# Patient Record
Sex: FEMALE | Race: BLACK OR AFRICAN AMERICAN | NOT HISPANIC OR LATINO | URBAN - METROPOLITAN AREA
[De-identification: names, ages, dates, MRNs, and addresses within clinical notes are randomized per-mention and may not be internally consistent; named-entity substitution may affect disease eponyms.]

---

## 2018-02-08 ENCOUNTER — EMERGENCY (EMERGENCY)
Facility: HOSPITAL | Age: 43
LOS: 1 days | Discharge: HOME | End: 2018-02-08
Attending: EMERGENCY MEDICINE | Admitting: EMERGENCY MEDICINE
Payer: COMMERCIAL

## 2018-02-08 VITALS
HEART RATE: 81 BPM | DIASTOLIC BLOOD PRESSURE: 88 MMHG | RESPIRATION RATE: 20 BRPM | SYSTOLIC BLOOD PRESSURE: 128 MMHG | OXYGEN SATURATION: 98 % | TEMPERATURE: 99 F

## 2018-02-08 VITALS
HEART RATE: 91 BPM | WEIGHT: 199.96 LBS | RESPIRATION RATE: 20 BRPM | HEIGHT: 63 IN | DIASTOLIC BLOOD PRESSURE: 95 MMHG | TEMPERATURE: 98 F | SYSTOLIC BLOOD PRESSURE: 162 MMHG | OXYGEN SATURATION: 100 %

## 2018-02-08 DIAGNOSIS — Z98.890 OTHER SPECIFIED POSTPROCEDURAL STATES: Chronic | ICD-10-CM

## 2018-02-08 DIAGNOSIS — R07.89 OTHER CHEST PAIN: ICD-10-CM

## 2018-02-08 LAB
ALBUMIN SERPL ELPH-MCNC: 4.4 G/DL — SIGNIFICANT CHANGE UP (ref 3.3–5)
ALP SERPL-CCNC: 49 U/L — SIGNIFICANT CHANGE UP (ref 40–120)
ALT FLD-CCNC: 10 U/L — SIGNIFICANT CHANGE UP (ref 10–45)
ANION GAP SERPL CALC-SCNC: 11 MMOL/L — SIGNIFICANT CHANGE UP (ref 5–17)
AST SERPL-CCNC: 17 U/L — SIGNIFICANT CHANGE UP (ref 10–40)
BASOPHILS NFR BLD AUTO: 1 % — SIGNIFICANT CHANGE UP (ref 0–2)
BILIRUB SERPL-MCNC: 0.3 MG/DL — SIGNIFICANT CHANGE UP (ref 0.2–1.2)
BUN SERPL-MCNC: 6 MG/DL — LOW (ref 7–23)
CALCIUM SERPL-MCNC: 9.6 MG/DL — SIGNIFICANT CHANGE UP (ref 8.4–10.5)
CHLORIDE SERPL-SCNC: 104 MMOL/L — SIGNIFICANT CHANGE UP (ref 96–108)
CO2 SERPL-SCNC: 27 MMOL/L — SIGNIFICANT CHANGE UP (ref 22–31)
CREAT SERPL-MCNC: 0.71 MG/DL — SIGNIFICANT CHANGE UP (ref 0.5–1.3)
EOSINOPHIL NFR BLD AUTO: 1 % — SIGNIFICANT CHANGE UP (ref 0–6)
GLUCOSE SERPL-MCNC: 103 MG/DL — HIGH (ref 70–99)
HCT VFR BLD CALC: 36.5 % — SIGNIFICANT CHANGE UP (ref 34.5–45)
HGB BLD-MCNC: 11.7 G/DL — SIGNIFICANT CHANGE UP (ref 11.5–15.5)
LYMPHOCYTES # BLD AUTO: 42.4 % — SIGNIFICANT CHANGE UP (ref 13–44)
MCHC RBC-ENTMCNC: 24.3 PG — LOW (ref 27–34)
MCHC RBC-ENTMCNC: 32.1 G/DL — SIGNIFICANT CHANGE UP (ref 32–36)
MCV RBC AUTO: 75.7 FL — LOW (ref 80–100)
MONOCYTES NFR BLD AUTO: 9.4 % — SIGNIFICANT CHANGE UP (ref 2–14)
NEUTROPHILS NFR BLD AUTO: 46.2 % — SIGNIFICANT CHANGE UP (ref 43–77)
PLATELET # BLD AUTO: 250 K/UL — SIGNIFICANT CHANGE UP (ref 150–400)
POTASSIUM SERPL-MCNC: 4.3 MMOL/L — SIGNIFICANT CHANGE UP (ref 3.5–5.3)
POTASSIUM SERPL-SCNC: 4.3 MMOL/L — SIGNIFICANT CHANGE UP (ref 3.5–5.3)
PROT SERPL-MCNC: 7.8 G/DL — SIGNIFICANT CHANGE UP (ref 6–8.3)
RBC # BLD: 4.82 M/UL — SIGNIFICANT CHANGE UP (ref 3.8–5.2)
RBC # FLD: 14.1 % — SIGNIFICANT CHANGE UP (ref 10.3–16.9)
SODIUM SERPL-SCNC: 142 MMOL/L — SIGNIFICANT CHANGE UP (ref 135–145)
TROPONIN T SERPL-MCNC: <0.01 NG/ML — SIGNIFICANT CHANGE UP (ref 0–0.01)
WBC # BLD: 4.8 K/UL — SIGNIFICANT CHANGE UP (ref 3.8–10.5)
WBC # FLD AUTO: 4.8 K/UL — SIGNIFICANT CHANGE UP (ref 3.8–10.5)

## 2018-02-08 PROCEDURE — 71046 X-RAY EXAM CHEST 2 VIEWS: CPT | Mod: 26

## 2018-02-08 PROCEDURE — 99284 EMERGENCY DEPT VISIT MOD MDM: CPT | Mod: 25

## 2018-02-08 PROCEDURE — 93010 ELECTROCARDIOGRAM REPORT: CPT

## 2018-02-08 PROCEDURE — 93005 ELECTROCARDIOGRAM TRACING: CPT

## 2018-02-08 PROCEDURE — 71046 X-RAY EXAM CHEST 2 VIEWS: CPT

## 2018-02-08 PROCEDURE — 96374 THER/PROPH/DIAG INJ IV PUSH: CPT

## 2018-02-08 PROCEDURE — 99285 EMERGENCY DEPT VISIT HI MDM: CPT | Mod: 25

## 2018-02-08 RX ORDER — MONTELUKAST 4 MG/1
0 TABLET, CHEWABLE ORAL
Qty: 0 | Refills: 0 | COMMUNITY

## 2018-02-08 RX ORDER — FAMOTIDINE 10 MG/ML
20 INJECTION INTRAVENOUS ONCE
Qty: 0 | Refills: 0 | Status: COMPLETED | OUTPATIENT
Start: 2018-02-08 | End: 2018-02-08

## 2018-02-08 RX ADMIN — FAMOTIDINE 20 MILLIGRAM(S): 10 INJECTION INTRAVENOUS at 11:52

## 2018-02-08 RX ADMIN — Medication 30 MILLILITER(S): at 11:52

## 2018-02-08 NOTE — ED PROVIDER NOTE - OBJECTIVE STATEMENT
42yoF o/w healthy, here with 2 weeks of left sided chest pain, intermittent, no associated sob/alfaro, n/v, dizziness or diaphoresis, lasts ~30 minutes and then self-resolves. No f/c, no cough/uri sx. Pt does report increased stress at work over the last several weeks and previous hx of anxiety attacks, but cannot recall what kind of sx she had. No family hx of heart disease. No recent travel or trauma. No other complaints.

## 2018-02-08 NOTE — ED PROVIDER NOTE - MEDICAL DECISION MAKING DETAILS
42yoF here with 2 weeks of intermittent L lower chest wall pain, no associated sx, +increased stress at work, vitals stable, exam unremarkable, trop negative, PERC negative. Sx likely stress related, low concern for cardiac etiology, will refer to cardiology for outpt eval. return precautions reviewed, verbalized understanding, agrees w/plan for discharge home and will refer to cardiology.

## 2020-07-29 NOTE — ED PROVIDER NOTE - CROS ED NEURO NEG
Last office visit: 6/10/2019    Next scheduled/on recall list: scheduled to see Sindhu Faulkner NP on 8/27/2020    Medication/dose: amolodipine 5 mg tablet   Take 1 tablet by mouth daily    Quantity/#refills: #90 no refills    Refill request completed? Yes    
no headache

## 2021-04-06 DIAGNOSIS — Z23 ENCOUNTER FOR IMMUNIZATION: ICD-10-CM

## 2021-04-18 ENCOUNTER — IMMUNIZATIONS (OUTPATIENT)
Dept: FAMILY MEDICINE CLINIC | Facility: HOSPITAL | Age: 46
End: 2021-04-18

## 2021-04-18 DIAGNOSIS — Z23 ENCOUNTER FOR IMMUNIZATION: Primary | ICD-10-CM

## 2021-04-18 PROCEDURE — 91300 SARS-COV-2 / COVID-19 MRNA VACCINE (PFIZER-BIONTECH) 30 MCG: CPT

## 2021-04-18 PROCEDURE — 0001A SARS-COV-2 / COVID-19 MRNA VACCINE (PFIZER-BIONTECH) 30 MCG: CPT

## 2021-05-12 ENCOUNTER — IMMUNIZATIONS (OUTPATIENT)
Dept: FAMILY MEDICINE CLINIC | Facility: HOSPITAL | Age: 46
End: 2021-05-12

## 2021-05-12 DIAGNOSIS — Z23 ENCOUNTER FOR IMMUNIZATION: Primary | ICD-10-CM

## 2021-05-12 PROCEDURE — 0002A SARS-COV-2 / COVID-19 MRNA VACCINE (PFIZER-BIONTECH) 30 MCG: CPT

## 2021-05-12 PROCEDURE — 91300 SARS-COV-2 / COVID-19 MRNA VACCINE (PFIZER-BIONTECH) 30 MCG: CPT

## 2022-06-28 ENCOUNTER — OFFICE VISIT (OUTPATIENT)
Dept: URGENT CARE | Facility: CLINIC | Age: 47
End: 2022-06-28
Payer: COMMERCIAL

## 2022-06-28 VITALS
HEART RATE: 82 BPM | HEIGHT: 63 IN | WEIGHT: 203 LBS | TEMPERATURE: 98 F | SYSTOLIC BLOOD PRESSURE: 153 MMHG | OXYGEN SATURATION: 98 % | DIASTOLIC BLOOD PRESSURE: 97 MMHG | BODY MASS INDEX: 35.97 KG/M2 | RESPIRATION RATE: 16 BRPM

## 2022-06-28 DIAGNOSIS — J01.00 ACUTE NON-RECURRENT MAXILLARY SINUSITIS: Primary | ICD-10-CM

## 2022-06-28 PROCEDURE — 99213 OFFICE O/P EST LOW 20 MIN: CPT | Performed by: PHYSICIAN ASSISTANT

## 2022-06-28 RX ORDER — MONTELUKAST SODIUM 10 MG/1
10 TABLET ORAL
COMMUNITY

## 2022-06-28 RX ORDER — FLUTICASONE PROPIONATE 50 MCG
50 SPRAY, SUSPENSION (ML) NASAL
COMMUNITY

## 2022-06-28 NOTE — PROGRESS NOTES
[facility]        NAME: Maximo Burger is a 48 y o  female  : 1975    MRN: [id number]  DATE: 2022  TIME: 9:56 AM    Assessment and Plan   Acute non-recurrent maxillary sinusitis [J01 00]  1  Acute non-recurrent maxillary sinusitis           Patient Instructions     Patient Instructions   Discussed patient symptoms are most likely viral or allergy related  To continue daily antihistamine and Flonase  Can continue to take over-the-counter ibuprofen  With any progression or worsening of symptoms to return or be seen in ER  Follow up with PCP in 3-5 days  Proceed to  ER if symptoms worsen  Chief Complaint     Chief Complaint   Patient presents with    Earache     Pt reports earache and sinus pressure, 3x days         History of Present Illness       Patient is a 47-year-old female presenting today with cold symptoms x3 days  Patient notes over the last few days she has been experiencing some nasal congestion, sinus pressure and ear discomfort, has been taking her daily allergy medications and occasional ibuprofen which does provide some relief of her discomfort, notes she is feeling better today than initial onset  Denies fever, chills, trouble swallowing, SOB, ear discharge or drainage, hearing loss  Review of Systems   Review of Systems   Constitutional: Negative for chills, fatigue and fever  HENT: Positive for congestion, ear pain, postnasal drip and sinus pressure  Negative for sore throat  Eyes: Negative for redness and itching  Respiratory: Positive for cough  Negative for chest tightness and shortness of breath  Cardiovascular: Negative for chest pain  Gastrointestinal: Negative for diarrhea, nausea and vomiting  Musculoskeletal: Negative for arthralgias and myalgias  Neurological: Negative for light-headedness and headaches           Current Medications       Current Outpatient Medications:     fluticasone (FLONASE) 50 mcg/act nasal spray, 50 sprays, Disp: , Rfl:     montelukast (Singulair) 10 mg tablet, 10 mg, Disp: , Rfl:     Current Allergies     Allergies as of 06/28/2022    (No Known Allergies)            The following portions of the patient's history were reviewed and updated as appropriate: allergies, current medications, past family history, past medical history, past social history, past surgical history and problem list      History reviewed  No pertinent past medical history  History reviewed  No pertinent surgical history  History reviewed  No pertinent family history  Medications have been verified  Objective   /97   Pulse 82   Temp 98 °F (36 7 °C)   Resp 16   Ht 5' 3" (1 6 m)   Wt 92 1 kg (203 lb)   LMP 06/20/2022   SpO2 98%   BMI 35 96 kg/m²        Physical Exam     Physical Exam  Vitals reviewed  Constitutional:       Appearance: Normal appearance  HENT:      Head: Normocephalic and atraumatic  Right Ear: Tympanic membrane, ear canal and external ear normal       Left Ear: Tympanic membrane, ear canal and external ear normal       Nose:      Right Turbinates: Swollen and pale  Left Turbinates: Swollen and pale  Right Sinus: Maxillary sinus tenderness present  No frontal sinus tenderness  Left Sinus: Maxillary sinus tenderness present  No frontal sinus tenderness  Mouth/Throat:      Mouth: Mucous membranes are moist       Pharynx: Oropharynx is clear  Eyes:      Conjunctiva/sclera: Conjunctivae normal       Pupils: Pupils are equal, round, and reactive to light  Cardiovascular:      Rate and Rhythm: Normal rate and regular rhythm  Pulses: Normal pulses  Heart sounds: Normal heart sounds  Pulmonary:      Effort: Pulmonary effort is normal       Breath sounds: Normal breath sounds  Musculoskeletal:      Cervical back: Normal range of motion  No tenderness  Lymphadenopathy:      Cervical: No cervical adenopathy  Skin:     General: Skin is warm  Capillary Refill: Capillary refill takes less than 2 seconds  Neurological:      Mental Status: She is alert

## 2022-06-28 NOTE — PATIENT INSTRUCTIONS
Discussed patient symptoms are most likely viral or allergy related  To continue daily antihistamine and Flonase  Can continue to take over-the-counter ibuprofen  With any progression or worsening of symptoms to return or be seen in ER

## 2022-06-28 NOTE — LETTER
June 28, 2022     Patient: Michael Gautam   YOB: 1975   Date of Visit: 6/28/2022       To Whom It May Concern: It is my medical opinion that Michael Gautam may return to work on 06/29/2022  Please excuse her absence until this time  If you have any questions or concerns, please don't hesitate to call           Sincerely,        Tiffanie Valentine PA-C    CC: No Recipients

## 2024-02-23 ENCOUNTER — OFFICE VISIT (OUTPATIENT)
Dept: FAMILY MEDICINE CLINIC | Facility: CLINIC | Age: 49
End: 2024-02-23
Payer: COMMERCIAL

## 2024-02-23 VITALS
SYSTOLIC BLOOD PRESSURE: 146 MMHG | TEMPERATURE: 98.6 F | BODY MASS INDEX: 36.77 KG/M2 | DIASTOLIC BLOOD PRESSURE: 86 MMHG | RESPIRATION RATE: 18 BRPM | WEIGHT: 207.5 LBS | OXYGEN SATURATION: 100 % | HEIGHT: 63 IN | HEART RATE: 92 BPM

## 2024-02-23 DIAGNOSIS — T78.40XD ALLERGY, SUBSEQUENT ENCOUNTER: ICD-10-CM

## 2024-02-23 DIAGNOSIS — I10 BENIGN ESSENTIAL HTN: ICD-10-CM

## 2024-02-23 DIAGNOSIS — R42 VERTIGO: ICD-10-CM

## 2024-02-23 DIAGNOSIS — Z76.89 ENCOUNTER TO ESTABLISH CARE: Primary | ICD-10-CM

## 2024-02-23 DIAGNOSIS — E78.49 OTHER HYPERLIPIDEMIA: ICD-10-CM

## 2024-02-23 DIAGNOSIS — R93.3 ABNORMAL COLONOSCOPY: ICD-10-CM

## 2024-02-23 DIAGNOSIS — K44.9 HIATAL HERNIA: ICD-10-CM

## 2024-02-23 DIAGNOSIS — Z12.31 BREAST CANCER SCREENING BY MAMMOGRAM: ICD-10-CM

## 2024-02-23 DIAGNOSIS — R73.03 PREDIABETES: ICD-10-CM

## 2024-02-23 PROBLEM — I15.9 SECONDARY HYPERTENSION: Status: ACTIVE | Noted: 2024-02-23

## 2024-02-23 PROBLEM — T78.40XA ALLERGIES: Status: ACTIVE | Noted: 2024-02-23

## 2024-02-23 PROBLEM — E78.5 HYPERLIPIDEMIA: Status: ACTIVE | Noted: 2024-02-23

## 2024-02-23 PROCEDURE — 99203 OFFICE O/P NEW LOW 30 MIN: CPT | Performed by: FAMILY MEDICINE

## 2024-02-23 RX ORDER — LISINOPRIL 5 MG/1
5 TABLET ORAL DAILY
Qty: 30 TABLET | Refills: 0 | Status: SHIPPED | OUTPATIENT
Start: 2024-02-23 | End: 2024-03-03 | Stop reason: SDUPTHER

## 2024-02-23 NOTE — PROGRESS NOTES
Name: Olga Short      : 1975      MRN: [id number]  Encounter Provider: Venecia Sosa DO  Encounter Date: 2024   Encounter department: [facility]    Assessment & Plan     1. Encounter to establish care    2. Hiatal hernia  Assessment & Plan:  History of hiatal hernia found on EGD in       3. Abnormal colonoscopy  Assessment & Plan:  Hx of polyps that were removed in 2019. Plan for colonoscopy in       4. Allergy, subsequent encounter  Assessment & Plan:  Allergy to dog - takes flonase and singulair      5. Other hyperlipidemia  Assessment & Plan:  History of hyperlipidemia, not taking any medications at this time    Plan:  -Pending lipid panel    Orders:  -     Lipid Panel with Direct LDL reflex; Future    6. Benign essential HTN  Assessment & Plan:  History of elevated BP over several years, never taken medication. Denies taking home BP.    Goal: < 140/90    Plan:  -Counseled patient on importance of adequate BP control.   -Advised patient to begin monitoring BP regularly using arm cuff, advised patient to start log and bring to next appointment  -Start lisinopril 5 mg daily    Orders:  -     CBC and differential; Future  -     Comprehensive metabolic panel; Future  -     TSH, 3rd generation with Free T4 reflex; Future  -     lisinopril (ZESTRIL) 5 mg tablet; Take 1 tablet (5 mg total) by mouth daily    7. Breast cancer screening by mammogram  -     Mammo screening bilateral w 3d & cad; Future; Expected date: 2024    8. Prediabetes  Assessment & Plan:  HgbA1c 6.1 in .     Plan:  -Counseled on importance of diet  -Pending repeat HgbA1c  -Consider metformin    Orders:  -     Hemoglobin A1C; Future    9. Vertigo  Assessment & Plan:  History of vertigo, not taking any medications. Denies syncope, light headedness.    Plan:  -Consider meclizine          Depression Screening and Follow-up Plan: Patient was screened for depression during today's encounter. They  "screened negative with a PHQ-2 score of 1.        Subjective      Patient presents to the clinic for establish care. She followed with PCP in [place] regularly. Hx of hyperlipidemia and HTN, not taking any medications. Hx of allergies to her dog, taking singulair and flonase.     Reports she has some episodes of vertigo, does not take any medications. Denies light headedness or syncope.       Review of Systems   Constitutional:  Negative for activity change, chills and fever.   HENT: Negative.     Respiratory: Negative.     Cardiovascular: Negative.    Gastrointestinal: Negative.    Musculoskeletal: Negative.    Neurological:  Negative for dizziness, seizures, syncope and light-headedness.       Current Outpatient Medications on File Prior to Visit   Medication Sig    fluticasone (FLONASE) 50 mcg/act nasal spray 50 sprays    montelukast (Singulair) 10 mg tablet 10 mg       Objective     /86 (BP Location: Left arm, Patient Position: Sitting, Cuff Size: Large)   Pulse 92   Temp 98.6 °F (37 °C) (Tympanic)   Resp 18   Ht 5' 2.75\" (1.594 m)   Wt 94.1 kg (207 lb 8 oz)   SpO2 100%   BMI 37.05 kg/m²     Physical Exam  Vitals reviewed.   Constitutional:       Appearance: Normal appearance.   HENT:      Head: Normocephalic and atraumatic.      Right Ear: External ear normal.      Left Ear: External ear normal.      Mouth/Throat:      Mouth: Mucous membranes are moist.   Eyes:      Conjunctiva/sclera: Conjunctivae normal.   Cardiovascular:      Rate and Rhythm: Normal rate and regular rhythm.      Pulses: Normal pulses.      Heart sounds: Normal heart sounds.   Pulmonary:      Effort: Pulmonary effort is normal.      Breath sounds: Normal breath sounds.   Abdominal:      General: There is no distension.      Palpations: Abdomen is soft.      Tenderness: There is no abdominal tenderness.   Musculoskeletal:         General: Normal range of motion.   Neurological:      Mental Status: She is alert.       Venecia Sosa, " DO

## 2024-02-23 NOTE — PATIENT INSTRUCTIONS
Healthy lipids which include Cholesterol and it's sub-fractions such as low density lipoprotein (LDL), high density liproprotein (HDL), triglycerides (TG) or blood fats are very important for general health, including heart and blood vessel health.   The main number on a standard lipid profile to improve is the non-HDL cholesterol. Generally, lowering LDL and raising HDL are good for health.     The main target in treating lipids is to lower LDL particle number and prevent the abnormal modification of LDL (I.e. oxidation oxLDL &/or glycosylation glycosylLDL),  Measuring these requires a specialized lipid test.     The most important aspect of improving lipids for most people is to eat a whole foods eating pattern that is mainly plants, with about a pound (at least 5 servings) of vegetable of different colors and 1 - 2 fruit (focus on berries) to obtain different nutrients.  Most patients will benefit from a handful of nuts a day. Ensure other aspects of healthy lifestyle, such as regular exercise, adequate sleep and good stress management.     Avoid long chain saturated fats, such as found in whole dairy products like cheese, red meat, skin of poultry, coconut milk.     Added excess sugars and refined carbohydrates (such as any product made of white flour), white rice, processed potato products like fries and chips are also under-appreciated major  of abnormal lipids, cardiovascular disease and other chronic diseases.    The following natural supplements can also help improve lipids. Your doctor will guide you on the best choices for you:      Phyto-sterols &  -stanols and their esters can help lower serum cholesterol by about 10% by blocking its absorption in the gut. They can be used for additive effect with Statins or Red Yeast Rice, lowering LDL cholesterol an additional 7 - 10%. However, they have not been directly studied on whether they lower cardiovascular disease. Incidentally, the major plant sterol  in these products is beta-sitosterol, which also may relieve urinary obstructive symptoms in benign prostatic hypertrophy (enlarged prostate).  (TIANNA A/H2)    Typical doses for sterols is 400 mg with a meal or 1000 mg (1 gram) of stanols  twice daily with meals.  Cautions: Sterols/Stanols appear to be very safe, with typical daily doses of 2 - 3 grams generally well tolerated, and studies up to 8 grams a day not showing adverse effects over 3 months. Sterols/stanols may interfere with absorption of fat soluble vitamins (A, D, E, K) and caratenoids, so be sure to eat lots of veggies and fruit containing these vitamins. Occasional GI upset. There are very rare reports of liver irritation.     Recommended brands (typical serving size = total amount of combination of sterols & stanols in mg)*:   Nature made CholestOff Plus is a top brand: (2 softgels = 900 mg). Dose: take 2 softgels twice a day with two largest meals of the day.   Kwaga CholestaCare (2 caps = 800 mg)  Dose: take 2 caps twice a day with two largest meals of the day.   iogyn Foresterol (1 softgel = 600 mg)  Dose: take 1 softgel three times a day with meals      Benecol is a butter substitute spread which contains 850 mg pf phytostanols per serving, and also may be helpful for those lowering cholesterol. A similar brand is Logicol Original.       *The above 3 brands meet the minimum requirement of at least 1.3 grams plant sterol esters (equivalent to 800 mg free sterols) to be taken in divided doses with meals, equivalent to  400 mg of free sterols twice a day with meals. Kelly forms are preferred. The FDA is considering increasing the minimum from 1.3 to 2 G per day, or a free sterol equivalent of 500 mg per serving taken in two meals for 1 gram a day.         Green tea (e.g.12 to 60 oz or 2 - 5 cups a day) contains catechins which modestly improve lipids and their function. (TIANNA A/H1)    Pomegranate juice & seeds such as 8 oz. Juice  and 1/4 - 1/2 cup seeds improve TG/HDL ratio and are potent antioxidants that lower oxLDL and glycosylLDL.  (TIANNA B/H1)    Monounsaturated fatty acids (MUFA) about 4 TBS per day, such as extra-virgin olive oil and nuts help improve lipids and blood vessel function.  (TIANNA B/H1)    Omega 3 fatty acids, ideally in natural form such as eating high quality cold water fish about twice a week may add modest benefit to cholesterol profiles to the above supplements also. In those who don't eat a whole foods form, supplements such as fish oil caps. Fish oil is especially useful in lowering triglycerides. It works synergistically with plant sterols to improve lipids and reduce inflammation. See separate document on omega 3 f.a.supplements for more details.  (TIANNA A/H2)    Sesame seeds and oil (about 50 grams/day) modestly lower total cholesterol, LDL and triglycerides.  (TIANNA B/H1)    Soy traditional products like tofu (about 30 - 50 grams a day) can moderately lower total cholesterol, LDL and triglycerides and raise HDL.    Curcurmin in bioavailable-enhanced forms may add further modest benefit to the above supplements in lowering LDL cholesterol, as well as reduce general inflammation that underlies many chronic diseases. See separate document on curcumin supplements for more details.  (TIANNA B/H2)    Garlic can also modestly lower total cholesterol by about 5%, and can lower triglycerides by about 10%. It has little or no effect on LDL or HDL, but may modestly slow atherosclerosis and reduce blood pressure. It can reduce harmful oxidation of LDL.   Amounts of garlic generally used in cooking are considered generally safe.  Good brands of garlic supplements include Life Extension Optimized Garlic and Kyolic Aged garlic extract Extra Strength [place] or Lane Best Grlic Odor-controlled garlic. Note that garlic can 'thin' the blood,so caution if on other blood thinners. See separate document on garlic supplements for more  details.  (TIANNA B/H2)    Flaxseed (Freshly ground, 1 - 2 TBS. per day) modestly improve lipid levels but more importantly improve endothelial (inner llining of blood vessel) functioning.     Citrus bergamot 1 gram a day significantly lowers LDL and TG, while increasing HDL, as well as act as an anti-oxidant and improve oxLDL. Usually well tolerated but can occasionally lead to dizziness, muscle cramps or heartburn. (TIANNA B/H2)    Pantethine (the disulfide derivative of pantothenic acid) is metabolized to cystamine-SH which clinical trials show may improve lipids over several months. Dose is 450 mg twice a day. Can be used with RYR, Statins, Niacin or Fibrates.  (TIANNA B/H2)    Resveratrol is derived from grapes and especially when used with N-aceyltcysteine (NAC), improves oxLDL. Dose of trans resveratrol 250 mg daily with N-acetylcysteine 1 gram daily  (TIANNA B/H2)      Wing Alvarado MD, Board certified in Integrative Medicine. Last updated 2/2/24

## 2024-02-25 LAB
ALBUMIN SERPL-MCNC: 4.4 G/DL (ref 3.9–4.9)
ALBUMIN/GLOB SERPL: 1.3 {RATIO} (ref 1.2–2.2)
ALP SERPL-CCNC: 63 IU/L (ref 44–121)
ALT SERPL-CCNC: 10 IU/L (ref 0–32)
AST SERPL-CCNC: 16 IU/L (ref 0–40)
BASOPHILS # BLD AUTO: 0.1 X10E3/UL (ref 0–0.2)
BASOPHILS NFR BLD AUTO: 1 %
BILIRUB SERPL-MCNC: 0.4 MG/DL (ref 0–1.2)
BUN SERPL-MCNC: 12 MG/DL (ref 6–24)
BUN/CREAT SERPL: 14 (ref 9–23)
CALCIUM SERPL-MCNC: 9.5 MG/DL (ref 8.7–10.2)
CHLORIDE SERPL-SCNC: 103 MMOL/L (ref 96–106)
CHOLEST SERPL-MCNC: 226 MG/DL (ref 100–199)
CO2 SERPL-SCNC: 22 MMOL/L (ref 20–29)
CREAT SERPL-MCNC: 0.83 MG/DL (ref 0.57–1)
EGFR: 86 ML/MIN/1.73
EOSINOPHIL # BLD AUTO: 0.1 X10E3/UL (ref 0–0.4)
EOSINOPHIL NFR BLD AUTO: 2 %
ERYTHROCYTE [DISTWIDTH] IN BLOOD BY AUTOMATED COUNT: 14.3 % (ref 11.7–15.4)
GLOBULIN SER-MCNC: 3.3 G/DL (ref 1.5–4.5)
GLUCOSE SERPL-MCNC: 95 MG/DL (ref 70–99)
HBA1C MFR BLD: 6.4 % (ref 4.8–5.6)
HCT VFR BLD AUTO: 40.3 % (ref 34–46.6)
HDLC SERPL-MCNC: 65 MG/DL
HGB BLD-MCNC: 12.4 G/DL (ref 11.1–15.9)
IMM GRANULOCYTES # BLD: 0 X10E3/UL (ref 0–0.1)
IMM GRANULOCYTES NFR BLD: 0 %
LDLC SERPL CALC-MCNC: 151 MG/DL (ref 0–99)
LYMPHOCYTES # BLD AUTO: 1.7 X10E3/UL (ref 0.7–3.1)
LYMPHOCYTES NFR BLD AUTO: 36 %
MCH RBC QN AUTO: 23.7 PG (ref 26.6–33)
MCHC RBC AUTO-ENTMCNC: 30.8 G/DL (ref 31.5–35.7)
MCV RBC AUTO: 77 FL (ref 79–97)
MONOCYTES # BLD AUTO: 0.7 X10E3/UL (ref 0.1–0.9)
MONOCYTES NFR BLD AUTO: 14 %
NEUTROPHILS # BLD AUTO: 2.2 X10E3/UL (ref 1.4–7)
NEUTROPHILS NFR BLD AUTO: 47 %
PLATELET # BLD AUTO: 290 X10E3/UL (ref 150–450)
POTASSIUM SERPL-SCNC: 4.6 MMOL/L (ref 3.5–5.2)
PROT SERPL-MCNC: 7.7 G/DL (ref 6–8.5)
RBC # BLD AUTO: 5.24 X10E6/UL (ref 3.77–5.28)
SODIUM SERPL-SCNC: 140 MMOL/L (ref 134–144)
TRIGL SERPL-MCNC: 61 MG/DL (ref 0–149)
TSH SERPL DL<=0.005 MIU/L-ACNC: 1.57 UIU/ML (ref 0.45–4.5)
WBC # BLD AUTO: 4.8 X10E3/UL (ref 3.4–10.8)

## 2024-02-25 NOTE — ASSESSMENT & PLAN NOTE
HgbA1c 6.1 in 2022.     Plan:  -Counseled on importance of diet  -Pending repeat HgbA1c  -Consider metformin

## 2024-02-25 NOTE — ASSESSMENT & PLAN NOTE
History of elevated BP over several years, never taken medication. Denies taking home BP.    Goal: < 140/90    Plan:  -Counseled patient on importance of adequate BP control.   -Advised patient to begin monitoring BP regularly using arm cuff, advised patient to start log and bring to next appointment  -Start lisinopril 5 mg daily

## 2024-02-25 NOTE — ASSESSMENT & PLAN NOTE
History of vertigo, not taking any medications. Denies syncope, light headedness.    Plan:  -Consider meclizine

## 2024-03-03 ENCOUNTER — TELEPHONE (OUTPATIENT)
Dept: OTHER | Facility: HOSPITAL | Age: 49
End: 2024-03-03

## 2024-03-03 DIAGNOSIS — R73.03 PREDIABETES: Primary | ICD-10-CM

## 2024-03-03 DIAGNOSIS — I10 BENIGN ESSENTIAL HTN: ICD-10-CM

## 2024-03-03 DIAGNOSIS — E78.49 OTHER HYPERLIPIDEMIA: ICD-10-CM

## 2024-03-03 RX ORDER — LISINOPRIL 5 MG/1
5 TABLET ORAL DAILY
Qty: 30 TABLET | Refills: 0 | Status: SHIPPED | OUTPATIENT
Start: 2024-03-03 | End: 2024-04-02

## 2024-03-03 RX ORDER — ATORVASTATIN CALCIUM 10 MG/1
10 TABLET, FILM COATED ORAL DAILY
Qty: 30 TABLET | Refills: 0 | Status: SHIPPED | OUTPATIENT
Start: 2024-03-03 | End: 2024-04-02

## 2024-03-04 DIAGNOSIS — R73.03 PREDIABETES: ICD-10-CM

## 2024-03-04 RX ORDER — METFORMIN HYDROCHLORIDE 500 MG/1
500 TABLET, EXTENDED RELEASE ORAL DAILY
Qty: 30 TABLET | Refills: 0 | Status: SHIPPED | OUTPATIENT
Start: 2024-03-04

## 2024-03-04 NOTE — TELEPHONE ENCOUNTER
Called to discuss results including lipid panel and HgbA1c.     Discussed starting statin - agreeable to lipitor 10 mg.     HgbA1c 6.4, discussed starting metformin agreeable.

## 2024-03-19 ENCOUNTER — PATIENT MESSAGE (OUTPATIENT)
Age: 49
End: 2024-03-19

## 2024-03-19 DIAGNOSIS — E78.49 OTHER HYPERLIPIDEMIA: ICD-10-CM

## 2024-03-19 DIAGNOSIS — R73.03 PREDIABETES: ICD-10-CM

## 2024-03-19 DIAGNOSIS — I10 BENIGN ESSENTIAL HTN: ICD-10-CM

## 2024-03-21 DIAGNOSIS — E78.49 OTHER HYPERLIPIDEMIA: ICD-10-CM

## 2024-03-21 DIAGNOSIS — R73.03 PREDIABETES: ICD-10-CM

## 2024-03-21 DIAGNOSIS — I10 BENIGN ESSENTIAL HTN: ICD-10-CM

## 2024-03-21 RX ORDER — METFORMIN HYDROCHLORIDE 500 MG/1
500 TABLET, EXTENDED RELEASE ORAL DAILY
Qty: 90 TABLET | Refills: 0 | Status: SHIPPED | OUTPATIENT
Start: 2024-03-21 | End: 2024-06-19

## 2024-03-21 RX ORDER — LISINOPRIL 5 MG/1
5 TABLET ORAL DAILY
Qty: 30 TABLET | Refills: 3 | Status: SHIPPED | OUTPATIENT
Start: 2024-03-21 | End: 2024-03-22 | Stop reason: SDUPTHER

## 2024-03-21 RX ORDER — ATORVASTATIN CALCIUM 10 MG/1
10 TABLET, FILM COATED ORAL DAILY
Qty: 30 TABLET | Refills: 3 | Status: SHIPPED | OUTPATIENT
Start: 2024-03-21 | End: 2024-03-22 | Stop reason: SDUPTHER

## 2024-03-22 ENCOUNTER — PATIENT MESSAGE (OUTPATIENT)
Age: 49
End: 2024-03-22

## 2024-03-22 RX ORDER — FLUVASTATIN SODIUM 80 MG/1
TABLET, FILM COATED, EXTENDED RELEASE ORAL
Refills: 0 | OUTPATIENT
Start: 2024-03-22

## 2024-03-22 RX ORDER — ATORVASTATIN CALCIUM 10 MG/1
10 TABLET, FILM COATED ORAL DAILY
Qty: 30 TABLET | Refills: 3 | Status: SHIPPED | OUTPATIENT
Start: 2024-03-22 | End: 2024-06-20

## 2024-03-22 RX ORDER — LISINOPRIL 5 MG/1
5 TABLET ORAL DAILY
Qty: 30 TABLET | Refills: 3 | Status: SHIPPED | OUTPATIENT
Start: 2024-03-22 | End: 2024-06-20

## 2024-03-22 NOTE — TELEPHONE ENCOUNTER
Hi Dr. Guardado-    Patient is requesting a 90 day supply of Rx's for Lisinopril and Atorvastatin. Patient states per her insurance will cover 90 day supply for her maintenance medications and she is down to her last dose of Lisinopril. Rx was sent yesterday for a 30 day supply, is it possible to resend this Rx with a 90 day supply.

## 2024-04-05 ENCOUNTER — TELEPHONE (OUTPATIENT)
Age: 49
End: 2024-04-05

## 2024-04-05 ENCOUNTER — PREP FOR PROCEDURE (OUTPATIENT)
Age: 49
End: 2024-04-05

## 2024-04-05 ENCOUNTER — OFFICE VISIT (OUTPATIENT)
Age: 49
End: 2024-04-05

## 2024-04-05 VITALS
RESPIRATION RATE: 18 BRPM | SYSTOLIC BLOOD PRESSURE: 148 MMHG | HEART RATE: 97 BPM | HEIGHT: 63 IN | DIASTOLIC BLOOD PRESSURE: 94 MMHG | OXYGEN SATURATION: 97 % | BODY MASS INDEX: 35.97 KG/M2 | WEIGHT: 203 LBS

## 2024-04-05 DIAGNOSIS — E78.49 OTHER HYPERLIPIDEMIA: ICD-10-CM

## 2024-04-05 DIAGNOSIS — Z11.4 SCREENING FOR HIV (HUMAN IMMUNODEFICIENCY VIRUS): ICD-10-CM

## 2024-04-05 DIAGNOSIS — Z11.59 NEED FOR HEPATITIS C SCREENING TEST: ICD-10-CM

## 2024-04-05 DIAGNOSIS — M25.562 CHRONIC PAIN OF LEFT KNEE: ICD-10-CM

## 2024-04-05 DIAGNOSIS — T78.40XD ALLERGY, SUBSEQUENT ENCOUNTER: ICD-10-CM

## 2024-04-05 DIAGNOSIS — R73.03 PREDIABETES: ICD-10-CM

## 2024-04-05 DIAGNOSIS — Z12.11 SCREENING FOR COLON CANCER: Primary | ICD-10-CM

## 2024-04-05 DIAGNOSIS — R93.3 ABNORMAL COLONOSCOPY: ICD-10-CM

## 2024-04-05 DIAGNOSIS — I10 BENIGN ESSENTIAL HTN: ICD-10-CM

## 2024-04-05 DIAGNOSIS — Z12.11 ENCOUNTER FOR COLORECTAL CANCER SCREENING: ICD-10-CM

## 2024-04-05 DIAGNOSIS — G89.29 CHRONIC PAIN OF LEFT KNEE: ICD-10-CM

## 2024-04-05 DIAGNOSIS — Z00.00 ANNUAL PHYSICAL EXAM: Primary | ICD-10-CM

## 2024-04-05 DIAGNOSIS — R42 VERTIGO: ICD-10-CM

## 2024-04-05 DIAGNOSIS — Z12.12 ENCOUNTER FOR COLORECTAL CANCER SCREENING: ICD-10-CM

## 2024-04-05 PROBLEM — M22.2X2 PATELLOFEMORAL SYNDROME, LEFT: Status: ACTIVE | Noted: 2024-04-05

## 2024-04-05 PROCEDURE — 99396 PREV VISIT EST AGE 40-64: CPT | Performed by: FAMILY MEDICINE

## 2024-04-05 NOTE — ASSESSMENT & PLAN NOTE
Chronic, stable, will repeat lipid panel in 2 months. Continue lipitor 10 mg, continue lifestyle modifications

## 2024-04-05 NOTE — PROGRESS NOTES
ADULT ANNUAL PHYSICAL  [facility] PRACT[facility]    NAME: Olga Short  AGE: 48 y.o. SEX: female  : 1975     DATE: 2024     Assessment and Plan:     Problem List Items Addressed This Visit       Abnormal colonoscopy     Hx of polyp removal in 2019, referral for GI made         Allergies     Chronic, stable, continue flonase and singulair.         Vertigo     Chronic, improving, consider meclizine if symptoms worsen         Hyperlipidemia     Chronic, stable, will repeat lipid panel in 2 months. Continue lipitor 10 mg, continue lifestyle modifications         Benign essential HTN     Chronic, stable, reports nervous when coming to doctor's office. Produced home BP log with readings < 140/90.    Tolerating medications well    Plan:  -Continue lisinopril 5 mg  -Continue regular BP monitoring         Prediabetes     Previous A1c 6.4, discussed importance of diet and lifestyle modifications. Reports she is tolerating metformin well.    Plan:  -F/u POCT A1c at next appointment         Left knee pain     Chronic, stable. Reports most tenderness to palpation superior to knee at insertion of quads. Additionally notes mild tenderness of palpation along patellar tendon. Rates pain 3/10, has been doing some home exercises.     Plan:  -PT referral made  -Discussed heat/ice & pain control with Tylenol/Motrin as needed         Relevant Orders    Ambulatory Referral to Physical Therapy     Other Visit Diagnoses       Annual physical exam    -  Primary    Encounter for colorectal cancer screening        Relevant Orders    Ambulatory Referral to Gastroenterology    Need for hepatitis C screening test        Relevant Orders    Hepatitis C Antibody    Screening for HIV (human immunodeficiency virus)        Relevant Orders    HIV 1/2 AG/AB w Reflex [facility] for 2 yr old and above            Immunizations and preventive care screenings were discussed with patient today.  Appropriate education was printed on patient's after visit summary.    Counseling:  Alcohol/drug use: discussed moderation in alcohol intake, the recommendations for healthy alcohol use, and avoidance of illicit drug use.  Dental Health: discussed importance of regular tooth brushing, flossing, and dental visits.  Injury prevention: discussed safety/seat belts, safety helmets, smoke detectors, carbon dioxide detectors, and smoking near bedding or upholstery.  Sexual health: discussed sexually transmitted diseases, partner selection, use of condoms, avoidance of unintended pregnancy, and contraceptive alternatives.  Exercise: the importance of regular exercise/physical activity was discussed. Recommend exercise 3-5 times per week for at least 30 minutes.     BMI Counseling: Body mass index is 35.96 kg/m². The BMI is above normal. Nutrition recommendations include decreasing fast food intake and consuming healthier snacks. Exercise recommendations include moderate physical activity 150 minutes/week and exercising 3-5 times per week. No pharmacotherapy was ordered. Patient referred to PCP. Rationale for BMI follow-up plan is due to patient being overweight or obese.     Depression Screening and Follow-up Plan: Patient was screened for depression during today's encounter. They screened negative with a PHQ-2 score of 0.        Return in about 2 months (around 6/5/2024) for Recheck chronic conditions including HTN and DM.     Chief Complaint:     Chief Complaint   Patient presents with    Follow-up      History of Present Illness:     Adult Annual Physical   Patient here for a comprehensive physical exam. The patient reports BP stable at home. High BP only when at any doctor's appointment - log is normal. Left knee pain 3/10 going into thigh.     Diet and Physical Activity  Diet/Nutrition: well balanced diet.   Exercise:  stretching and walking .       Depression Screening  PHQ-2/9 Depression Screening    Little interest  or pleasure in doing things: 0 - not at all  Feeling down, depressed, or hopeless: 0 - not at all  PHQ-2 Score: 0  PHQ-2 Interpretation: Negative depression screen       General Health  Sleep: sleeps well.   Hearing:  no aids .  Vision: goes for regular eye exams.  Following up in weeks   Dental: regular dental visits.       /GYN Health  Follows with gynecology? no  Patient is: premenopausal  Last menstrual period: unknown  Contraceptive method: IUD placement.    Advanced Care Planning  Do you have an advanced directive? no  Do you have a durable medical power of ? no  ACP document given to the patient? no     Review of Systems:     Review of Systems   Constitutional:  Negative for activity change, chills, fatigue and fever.   HENT: Negative.     Respiratory: Negative.     Cardiovascular: Negative.    Gastrointestinal: Negative.    Musculoskeletal:  Positive for arthralgias (left knee pain).   Neurological:  Negative for dizziness, seizures, syncope and light-headedness.      Past Medical History:     Past Medical History:   Diagnosis Date    Gestational diabetes       Past Surgical History:     Past Surgical History:   Procedure Laterality Date    APPENDECTOMY      BUNIONECTOMY        Social History:     Social History     Socioeconomic History    Marital status:      Spouse name: None    Number of children: None    Years of education: None    Highest education level: None   Occupational History    None   Tobacco Use    Smoking status: Never    Smokeless tobacco: Never   Vaping Use    Vaping status: Never Used   Substance and Sexual Activity    Alcohol use: Yes     Comment: socially    Drug use: Never    Sexual activity: None   Other Topics Concern    None   Social History Narrative    None     Social Determinants of Health     Financial Resource Strain: Low Risk  (4/5/2024)    Overall Financial Resource Strain (CARDIA)     Difficulty of Paying Living Expenses: Not hard at all   Food Insecurity:  "No Food Insecurity (4/5/2024)    Hunger Vital Sign     Worried About Running Out of Food in the Last Year: Never true     Ran Out of Food in the Last Year: Never true   Transportation Needs: No Transportation Needs (4/5/2024)    PRAPARE - Transportation     Lack of Transportation (Medical): No     Lack of Transportation (Non-Medical): No   Physical Activity: Not on file   Stress: Not on file   Social Connections: Not on file   Intimate Partner Violence: Low Risk  (10/18/2019)    Received from [employer]    Violence     Does anyone in your life hurt you, threaten you, frighten you or make you feel unsafe?: No   Housing Stability: Unknown (4/5/2024)    Housing Stability Vital Sign     Unable to Pay for Housing in the Last Year: No     Number of Places Lived in the Last Year: Not on file     Unstable Housing in the Last Year: No      Family History:     Family History   Problem Relation Age of Onset    Diabetes Mother     Hypertension Mother     Diabetes Father     Hypertension Father     Hypertension Sister     Hypertension Brother     Diabetes Paternal Uncle     Diabetes Cousin       Current Medications:     Current Outpatient Medications   Medication Sig Dispense Refill    atorvastatin (LIPITOR) 10 mg tablet Take 1 tablet (10 mg total) by mouth daily 30 tablet 3    fluticasone (FLONASE) 50 mcg/act nasal spray 50 sprays      lisinopril (ZESTRIL) 5 mg tablet Take 1 tablet (5 mg total) by mouth daily 30 tablet 3    metFORMIN (GLUCOPHAGE-XR) 500 mg 24 hr tablet Take 1 tablet (500 mg total) by mouth daily 90 tablet 0    montelukast (Singulair) 10 mg tablet 10 mg       No current facility-administered medications for this visit.      Allergies:     Allergies   Allergen Reactions    Dog Epithelium Allergic Rhinitis, Cough, Dizziness, Headache, Nasal Congestion and Sneezing      Physical Exam:     /94 (BP Location: Left arm, Patient Position: Sitting)   Pulse 97   Resp 18   Ht 5' 3\" (1.6 m)   " Wt 92.1 kg (203 lb)   SpO2 97%   BMI 35.96 kg/m²     Physical Exam  HENT:      Head: Normocephalic and atraumatic.      Right Ear: External ear normal.      Left Ear: External ear normal.      Mouth/Throat:      Pharynx: Oropharynx is clear.   Eyes:      Conjunctiva/sclera: Conjunctivae normal.   Cardiovascular:      Rate and Rhythm: Normal rate and regular rhythm.      Heart sounds: Normal heart sounds.   Pulmonary:      Effort: Pulmonary effort is normal.      Breath sounds: Normal breath sounds.   Abdominal:      General: There is no distension.      Palpations: Abdomen is soft.      Tenderness: There is no abdominal tenderness.   Musculoskeletal:         General: Tenderness (left knee) present.      Cervical back: Neck supple.      Right lower leg: No edema.      Left lower leg: No edema.   Skin:     General: Skin is warm and dry.   Neurological:      Mental Status: She is alert and oriented to person, place, and time.          Venecia Sosa, [facility]

## 2024-04-05 NOTE — TELEPHONE ENCOUNTER
04/05/24  Screened by: Tali Archuleta    Referring Provider Venecia Sosa    Pre- Screening:     There is no height or weight on file to calculate BMI.  Has patient been referred for a routine screening Colonoscopy? yes  Is the patient between 45-75 years old? yes      Previous Colonoscopy yes   If yes:    Date: 2019    Facility:     Reason:           Does the patient want to see a Gastroenterologist prior to their procedure OR are they having any GI symptoms? no    Has the patient been hospitalized or had abdominal surgery in the past 6 months? no    Does the patient use supplemental oxygen? no    Does the patient take Coumadin, Lovenox, Plavix, Elliquis, Xarelto, or other blood thinning medication? no    Has the patient had a stroke, cardiac event, or stent placed in the past year? no        If patient is between 45yrs - 49yrs, please advise patient that we will have to confirm benefits & coverage with their insurance company for a routine screening colonoscopy.

## 2024-04-05 NOTE — TELEPHONE ENCOUNTER
.Patient called in to reschedule procedure due to a conflict in schedule patient is now gómez on  5/22 DR BARB CORDOBA

## 2024-04-05 NOTE — TELEPHONE ENCOUNTER
Scheduled date of colonoscopy (as of today):5/21/24  Physician performing colonoscopy:Dr. Mei  Location of colonoscopy:[facility]  Bowel prep reviewed with patient:Laci/Smita prep instr sent to pts email [email or web address]   Instructions reviewed with patient by:BAILEE  Clearances: N/A

## 2024-04-05 NOTE — ASSESSMENT & PLAN NOTE
Chronic, stable. Reports most tenderness to palpation superior to knee at insertion of quads. Additionally notes mild tenderness of palpation along patellar tendon. Rates pain 3/10, has been doing some home exercises.     Plan:  -PT referral made  -Discussed heat/ice & pain control with Tylenol/Motrin as needed

## 2024-04-05 NOTE — ASSESSMENT & PLAN NOTE
Previous A1c 6.4, discussed importance of diet and lifestyle modifications. Reports she is tolerating metformin well.    Plan:  -F/u POCT A1c at next appointment

## 2024-04-05 NOTE — ASSESSMENT & PLAN NOTE
Chronic, stable, reports nervous when coming to doctor's office. Produced home BP log with readings < 140/90.    Tolerating medications well    Plan:  -Continue lisinopril 5 mg  -Continue regular BP monitoring

## 2024-04-06 LAB
HCV AB S/CO SERPL IA: NON REACTIVE
HIV 1+2 AB+HIV1 P24 AG SERPL QL IA: NON REACTIVE

## 2024-04-08 ENCOUNTER — PATIENT MESSAGE (OUTPATIENT)
Age: 49
End: 2024-04-08

## 2024-04-08 DIAGNOSIS — R73.03 PREDIABETES: ICD-10-CM

## 2024-04-08 DIAGNOSIS — E78.49 OTHER HYPERLIPIDEMIA: Primary | ICD-10-CM

## 2024-04-14 DIAGNOSIS — I10 BENIGN ESSENTIAL HTN: ICD-10-CM

## 2024-04-14 DIAGNOSIS — E78.49 OTHER HYPERLIPIDEMIA: ICD-10-CM

## 2024-04-15 RX ORDER — ATORVASTATIN CALCIUM 10 MG/1
10 TABLET, FILM COATED ORAL DAILY
Qty: 90 TABLET | Refills: 2 | Status: SHIPPED | OUTPATIENT
Start: 2024-04-15 | End: 2024-04-24

## 2024-04-15 RX ORDER — LISINOPRIL 5 MG/1
5 TABLET ORAL DAILY
Qty: 90 TABLET | Refills: 2 | Status: SHIPPED | OUTPATIENT
Start: 2024-04-15 | End: 2024-07-14

## 2024-04-19 ENCOUNTER — EVALUATION (OUTPATIENT)
Dept: PHYSICAL THERAPY | Facility: CLINIC | Age: 49
End: 2024-04-19
Payer: COMMERCIAL

## 2024-04-19 DIAGNOSIS — G89.29 CHRONIC PAIN OF LEFT KNEE: Primary | ICD-10-CM

## 2024-04-19 DIAGNOSIS — M25.562 CHRONIC PAIN OF LEFT KNEE: Primary | ICD-10-CM

## 2024-04-19 PROCEDURE — 97161 PT EVAL LOW COMPLEX 20 MIN: CPT

## 2024-04-19 NOTE — PROGRESS NOTES
PT Evaluation   Today's date: 2024  Patient name: Olga Shotr  : 1975  MRN: [id number]  Referring provider: Venecia Sosa DO  Dx:   Encounter Diagnosis     ICD-10-CM    1. Chronic pain of left knee  M25.562 Ambulatory Referral to Physical Therapy    G89.29             Assessment  Assessment details: Patient is a 48 y.o. Female who presents with referring diagnosis of chronic pain of left knee. Patient's greatest concern is worry over not knowing what's wrong and fear of not being able to keep active.    Primary movement impairment diagnosis of knee pain with mobility deficits resulting in pathoanatomical symptoms of restricted range of motion, muscular power deficits, motor control deficits, and functional limitations. The aforementioned impairments have limited the patient's ability to walk usual distances and lift without pain. No further referral appears necessary at this time based upon examination results.    Patient education performed during today's session included: HEP consisting of REIL/ANIYA progressions    Primary movement impairments:  1) Mobility deficits  2) Motor control deficits    Etiological factors include: onset after massage approx 1 month ago        Impairments: Abnormal coordination, Abnormal gait, Abnormal muscle tone, Abnormal or restricted ROM, Activity intolerance, Impaired balance, Impaired physical strength, Lacks appropriate HEP, Poor posture, Poor body mechanics, Pain with function, Weight-bearing intolerance, Abnormal movement, and Abnormal muscle firing  Understanding of Dx/Px/POC: Good  Prognosis: Good   Positive prognostic factors: active lifestyle, motivation for improvement   Negative prognostic factors: comorbid conditions    Patient verbalized understanding of POC.         Please contact me if you have any questions or recommendations. Thank you for the referral and the opportunity to share  in Moshannon Han's care.        Plan  Patient would benefit from: PT Eval and Skilled PT  Planned modality interventions: Biofeedback, Cryotherapy, TENS, Thermotherapy: Hydrocollator Packs, and Traction  Planned therapy interventions: Abdominal trunk stabilization, ADL training, Balance, Balance/WB training, Body mechanics training, Coordination, Dry Needling, Functional ROM exercises, Gait training, HEP, Joint mobilization, Manual therapy, Carr taping, Motor coordination training, Neuromuscular re-education, Patient education, Strengthening, Stretching, Therapeutic activities, Therapeutic exercises, and Activity modification  Frequency: 2x/wk  Duration in weeks: 8  Plan of Care beginning date: 4/19/2024  Plan of Care expiration date: 8 weeks - 6/14/2024  Treatment plan discussed with: Patient       Goals  Short Term Goals (4 weeks):    - Patient will be independent in basic HEP 2-3 weeks  - Patient will report >50% reduction in pain  - Patient will demonstrate >1/3 improvement in MMT grade as applicable  - Patient will demo full pain free knee flexion bilat    Long Term Goals (8 weeks):  - Patient will be independent in a comprehensive home exercise program  - Patient FOTO score will improve >61/100  - Patient will self-report >90% improvement in function  - Patient will walk >1000 feet (community distance ambulator) without increase in knee pain  - Patient will deny pain with lifting      Subjective    History of Present Illness  - Mechanism of injury: Patient reports to physical therapy with approx 1 month onset of left anterior knee pain that began after getting a massage. States she was told she had tightness about her left anterior thigh and right posterior thigh tightness, which is what he focused on during the massage. States she has had knee pain since then. States she also occasionally gets medial ankle pain on left. Denies right sided pain. States she went to get massage due to left sided back pain.  States she has not been getting this since. Current aggravating factors include walking, picking up objects, occasional increased difficulty with getting out of a chair. Reports occasional knee buckling. Denies numbness/tingling. Can negotiate stairs reciprocally with minimal pain. Has been trying independent exercises, which has not changed her symptoms. Denies changes in B&B habits.    Work history: works in office, sits for most of day    Pain  - Current pain ratin/10  - At best pain ratin/10  - At worst pain ratin/10    Social Support  - Stairs in house: full flight         Objective     Sensation  - Light touch: grossly intact and equal bilaterally    LE MMT  LEFT  RIGHT  -Hip Flexion:   4/5  4+/5  -Hip Abduction: 4/5  4/5  -Hip Adduction: 4+/5  4+/5  -Hip IR:  4/  4/5  -Hip ER:  4/  4/5    -Knee Flexion  4+/5  4+/5  -Knee Extension 4/5  4+/5    Lumbar Spine Range of Motion  Flexion:  Minimally limited  without pain  Extension:  Moderately limited  without pain  Lateral Flexion - Left:  Minimally limited  without pain  Lateral Flexion - Right:  Moderately limited  with tightness    Knee Range of Motion    LEFT  RIGHT  - Flexion 125 *p   - Extension -3  -8     Mechanical Exam  Comparable Sign: left anterior knee pain with squatting, tenderness to palpation over quad tendon  - ANIYA: abolished knee pain with squatting, improved TTP  - REIL: abolished knee pain with squatting, minimal TTP remained    Palpation  TTP noted superior patella, quad tendon  Negative for TTP inferior patella, patellar tendon, quad belly, medial/lateral joint    Diagnostic Tests Performed  Supine to long sit: posterior rotation of left innominate  Positive slump on left  Static standing: elevation of left iliac crest      Functional Assessment  -Functional Squat: mild weight shift to right with reproduction of anterior knee pain             Insurance:  AMA/CMS Eval/ Re-eval POC expires FOTO Auth #/ Referral # Total  units  Start date  Expiration date Extension  Visit limitation?  PT only or  PT+OT? Co-Insurance   [employer]: CMS 4/19/24 6/14/24 31      yes 90 PT/OT/SP $10 co-pay                                                                  Date 4-19              Units:  Used 1              Authed:  Remaining  89                   Date               Units:  Used               Authed:  Remaining                      Date 4/19/24        Visit Number IE        Manual          Assess pelvic alignment                                   Neuro Re-Ed         Sciatic nerve glides         Bridge progressions         Clamshells         Hip burners         Lateral stepping         Backward step downs                                             TherEx         Mechanical Exam ANIYA/REIL rev for HEP        SLR                                                      TherAct         Patient education                                             Gait Training                                    Modalities         CP               Precautions: 2  Past Medical History:   Diagnosis Date    Gestational diabetes

## 2024-04-19 NOTE — LETTER
2024    Venecia Sosa DO  [address]  [address]  [facility] [address]    Patient: Olga Short   YOB: 1975   Date of Visit: 2024     Encounter Diagnosis     ICD-10-CM    1. Chronic pain of left knee  M25.562 Ambulatory Referral to Physical Therapy    G89.29           Dear Dr. Sosa:    Thank you for your recent referral of Olga Short. Please review the attached evaluation summary from Olga's recent visit.     Please verify that you agree with the plan of care by signing the attached order.     If you have any questions or concerns, please do not hesitate to call.     I sincerely appreciate the opportunity to share in the care of one of your patients and hope to have another opportunity to work with you in the near future.       Sincerely,    Mehnaz Hopper, PT      Referring Provider:      I certify that I have read the below Plan of Care and certify the need for these services furnished under this plan of treatment while under my care.                    Venecia Sosa DO  [address]  [address] [place] [id number]  Via In Basket                                                                              PT Evaluation   Today's date: 2024  Patient name: Olga Short  : 1975  MRN: [id number]  Referring provider: Venecia Sosa DO  Dx:   Encounter Diagnosis     ICD-10-CM    1. Chronic pain of left knee  M25.562 Ambulatory Referral to Physical Therapy    G89.29             Assessment  Assessment details: Patient is a 48 y.o. Female who presents with referring diagnosis of chronic pain of left knee. Patient's greatest concern is worry over not knowing what's wrong and fear of not being able to keep active.    Primary movement impairment diagnosis of knee pain with mobility deficits resulting in pathoanatomical symptoms of restricted range of motion, muscular power deficits, motor control deficits, and functional limitations. The  aforementioned impairments have limited the patient's ability to walk usual distances and lift without pain. No further referral appears necessary at this time based upon examination results.    Patient education performed during today's session included: HEP consisting of REIL/ANIYA progressions    Primary movement impairments:  1) Mobility deficits  2) Motor control deficits    Etiological factors include: onset after massage approx 1 month ago        Impairments: Abnormal coordination, Abnormal gait, Abnormal muscle tone, Abnormal or restricted ROM, Activity intolerance, Impaired balance, Impaired physical strength, Lacks appropriate HEP, Poor posture, Poor body mechanics, Pain with function, Weight-bearing intolerance, Abnormal movement, and Abnormal muscle firing  Understanding of Dx/Px/POC: Good  Prognosis: Good   Positive prognostic factors: active lifestyle, motivation for improvement   Negative prognostic factors: comorbid conditions    Patient verbalized understanding of POC.         Please contact me if you have any questions or recommendations. Thank you for the referral and the opportunity to share in Olga Short's care.        Plan  Patient would benefit from: PT Eval and Skilled PT  Planned modality interventions: Biofeedback, Cryotherapy, TENS, Thermotherapy: Hydrocollator Packs, and Traction  Planned therapy interventions: Abdominal trunk stabilization, ADL training, Balance, Balance/WB training, Body mechanics training, Coordination, Dry Needling, Functional ROM exercises, Gait training, HEP, Joint mobilization, Manual therapy, Carr taping, Motor coordination training, Neuromuscular re-education, Patient education, Strengthening, Stretching, Therapeutic activities, Therapeutic exercises, and Activity modification  Frequency: 2x/wk  Duration in weeks: 8  Plan of Care beginning date: 4/19/2024  Plan of Care expiration date: 8 weeks - 6/14/2024  Treatment plan discussed with: Patient        Goals  Short Term Goals (4 weeks):    - Patient will be independent in basic HEP 2-3 weeks  - Patient will report >50% reduction in pain  - Patient will demonstrate >1/3 improvement in MMT grade as applicable  - Patient will demo full pain free knee flexion bilat    Long Term Goals (8 weeks):  - Patient will be independent in a comprehensive home exercise program  - Patient FOTO score will improve >61/100  - Patient will self-report >90% improvement in function  - Patient will walk >1000 feet (community distance ambulator) without increase in knee pain  - Patient will deny pain with lifting      Subjective    History of Present Illness  - Mechanism of injury: Patient reports to physical therapy with approx 1 month onset of left anterior knee pain that began after getting a massage. States she was told she had tightness about her left anterior thigh and right posterior thigh tightness, which is what he focused on during the massage. States she has had knee pain since then. States she also occasionally gets medial ankle pain on left. Denies right sided pain. States she went to get massage due to left sided back pain. States she has not been getting this since. Current aggravating factors include walking, picking up objects, occasional increased difficulty with getting out of a chair. Reports occasional knee buckling. Denies numbness/tingling. Can negotiate stairs reciprocally with minimal pain. Has been trying independent exercises, which has not changed her symptoms. Denies changes in B&B habits.    Work history: works in office, sits for most of day    Pain  - Current pain ratin/10  - At best pain ratin/10  - At worst pain ratin/10    Social Support  - Stairs in house: full flight         Objective     Sensation  - Light touch: grossly intact and equal bilaterally    LE MMT  LEFT  RIGHT  -Hip Flexion:   4/5  4+/5  -Hip Abduction: 4/5  4/5  -Hip Adduction: 4+/5  4+/5  -Hip IR:  4/  4/5  -Hip  ER:  4/5  4/5    -Knee Flexion  4+/5  4+/5  -Knee Extension 4/5  4+/5    Lumbar Spine Range of Motion  Flexion:  Minimally limited  without pain  Extension:  Moderately limited  without pain  Lateral Flexion - Left:  Minimally limited  without pain  Lateral Flexion - Right:  Moderately limited  with tightness    Knee Range of Motion    LEFT  RIGHT  - Flexion 125 *p   - Extension -3  -8     Mechanical Exam  Comparable Sign: left anterior knee pain with squatting, tenderness to palpation over quad tendon  - ANIYA: abolished knee pain with squatting, improved TTP  - REIL: abolished knee pain with squatting, minimal TTP remained    Palpation  TTP noted superior patella, quad tendon  Negative for TTP inferior patella, patellar tendon, quad belly, medial/lateral joint    Diagnostic Tests Performed  Supine to long sit: posterior rotation of left innominate  Positive slump on left  Static standing: elevation of left iliac crest      Functional Assessment  -Functional Squat: mild weight shift to right with reproduction of anterior knee pain             Insurance:  A/CMS Eval/ Re-eval POC expires FOTO Auth #/ Referral # Total units  Start date  Expiration date Extension  Visit limitation?  PT only or  PT+OT? Co-Insurance   [employer]: CMS 4/19/24 6/14/24 31      yes 90 PT/OT/SP $10 co-pay                                                                  Date 4-19              Units:  Used 1              Authed:  Remaining  89                   Date               Units:  Used               Authed:  Remaining                      Date 4/19/24        Visit Number IE        Manual          Assess pelvic alignment                                   Neuro Re-Ed         Sciatic nerve glides         Bridge progressions         Clamshells         Hip burners         Lateral stepping         Backward step downs                                             TherEx         Mechanical Exam ANIYA/REIL rev for HEP        SLR                                                       TherAct         Patient education                                             Gait Training                                    Modalities         CP               Precautions: 2  Past Medical History:   Diagnosis Date   • Gestational diabetes

## 2024-04-24 RX ORDER — ATORVASTATIN CALCIUM 10 MG/1
10 TABLET, FILM COATED ORAL DAILY
Qty: 90 TABLET | Refills: 3 | Status: SHIPPED | OUTPATIENT
Start: 2024-04-24

## 2024-04-24 RX ORDER — METFORMIN HYDROCHLORIDE 500 MG/1
500 TABLET, EXTENDED RELEASE ORAL
Qty: 90 TABLET | Refills: 3 | Status: SHIPPED | OUTPATIENT
Start: 2024-04-24 | End: 2024-07-23

## 2024-04-26 ENCOUNTER — OFFICE VISIT (OUTPATIENT)
Dept: PHYSICAL THERAPY | Facility: CLINIC | Age: 49
End: 2024-04-26
Payer: COMMERCIAL

## 2024-04-26 DIAGNOSIS — G89.29 CHRONIC PAIN OF LEFT KNEE: Primary | ICD-10-CM

## 2024-04-26 DIAGNOSIS — M25.562 CHRONIC PAIN OF LEFT KNEE: Primary | ICD-10-CM

## 2024-04-26 PROCEDURE — 97110 THERAPEUTIC EXERCISES: CPT

## 2024-04-26 PROCEDURE — 97112 NEUROMUSCULAR REEDUCATION: CPT

## 2024-05-08 ENCOUNTER — ANESTHESIA (OUTPATIENT)
Dept: ANESTHESIOLOGY | Facility: HOSPITAL | Age: 49
End: 2024-05-08

## 2024-05-08 ENCOUNTER — ANESTHESIA EVENT (OUTPATIENT)
Dept: ANESTHESIOLOGY | Facility: HOSPITAL | Age: 49
End: 2024-05-08

## 2024-05-17 ENCOUNTER — OFFICE VISIT (OUTPATIENT)
Age: 49
End: 2024-05-17

## 2024-05-17 VITALS
BODY MASS INDEX: 35.95 KG/M2 | WEIGHT: 202.9 LBS | SYSTOLIC BLOOD PRESSURE: 136 MMHG | OXYGEN SATURATION: 97 % | TEMPERATURE: 97.9 F | HEIGHT: 63 IN | DIASTOLIC BLOOD PRESSURE: 86 MMHG | HEART RATE: 107 BPM

## 2024-05-17 DIAGNOSIS — E78.49 OTHER HYPERLIPIDEMIA: ICD-10-CM

## 2024-05-17 DIAGNOSIS — I10 BENIGN ESSENTIAL HTN: Primary | ICD-10-CM

## 2024-05-17 DIAGNOSIS — R73.03 PREDIABETES: ICD-10-CM

## 2024-05-17 LAB
CHOLEST SERPL-MCNC: 151 MG/DL (ref 100–199)
EST. AVERAGE GLUCOSE BLD GHB EST-MCNC: 134 MG/DL
HBA1C MFR BLD: 6.3 % (ref 4.8–5.6)
HDLC SERPL-MCNC: 64 MG/DL
LDLC SERPL CALC-MCNC: 74 MG/DL (ref 0–99)
LDLC/HDLC SERPL: 1.2 RATIO (ref 0–3.2)
SL AMB VLDL CHOLESTEROL CALC: 13 MG/DL (ref 5–40)
TRIGL SERPL-MCNC: 63 MG/DL (ref 0–149)

## 2024-05-17 PROCEDURE — 99213 OFFICE O/P EST LOW 20 MIN: CPT | Performed by: FAMILY MEDICINE

## 2024-05-17 RX ORDER — COPPER 313.4 MG/1
INTRAUTERINE DEVICE INTRAUTERINE
COMMUNITY

## 2024-05-17 RX ORDER — LISINOPRIL 5 MG/1
5 TABLET ORAL DAILY
Qty: 90 TABLET | Refills: 3 | Status: SHIPPED | OUTPATIENT
Start: 2024-05-17 | End: 2024-08-15

## 2024-05-17 NOTE — PROGRESS NOTES
Ambulatory Visit  Name: Olga Short      : 1975      MRN: [id number]  Encounter Provider: Venecia Sosa DO  Encounter Date: 2024   Encounter department: [facility]    Assessment & Plan   1. Benign essential HTN  Assessment & Plan:  Chronic, stable, discussed continuing lisinopril 5 mg. Advised patient to make BP log and bring to follow up appointment.   Orders:  -     lisinopril (ZESTRIL) 5 mg tablet; Take 1 tablet (5 mg total) by mouth daily  2. Other hyperlipidemia  Assessment & Plan:  Chronic, stable. Follow up repeat lipid panel  Orders:  -     Lipid Panel with Direct LDL reflex; Future  -     Lipid Panel with Direct LDL reflex  3. Prediabetes  Assessment & Plan:  Chronic, stable, tolerating metformin, follow up on A1c  Orders:  -     Hemoglobin A1C; Future  -     Hemoglobin A1C         History of Present Illness     HPI    BP log is much higher here than at home - averaging 120/80 at home. Noticing that she is accountable to health and diet. Denies any acute complaints .       Review of Systems   Constitutional:  Negative for activity change, chills and fever.   HENT: Negative.     Respiratory: Negative.     Cardiovascular: Negative.    Gastrointestinal: Negative.    Musculoskeletal: Negative.    Neurological:  Negative for dizziness, seizures, syncope and light-headedness.     Past Medical History:   Diagnosis Date    Gestational diabetes      Past Surgical History:   Procedure Laterality Date    APPENDECTOMY      BUNIONECTOMY       Family History   Problem Relation Age of Onset    Diabetes Mother     Hypertension Mother     Diabetes Father     Hypertension Father     Hypertension Sister     Hypertension Brother     Diabetes Paternal Uncle     Diabetes Cousin      Social History     Tobacco Use    Smoking status: Never    Smokeless tobacco: Never   Vaping Use    Vaping status: Never Used   Substance and Sexual Activity    Alcohol use: Yes     Comment:  "socially    Drug use: Never    Sexual activity: Not on file     Current Outpatient Medications on File Prior to Visit   Medication Sig    atorvastatin (LIPITOR) 10 mg tablet Take 1 tablet (10 mg total) by mouth daily    fluticasone (FLONASE) 50 mcg/act nasal spray 1 spray    metFORMIN (GLUCOPHAGE-XR) 500 mg 24 hr tablet Take 1 tablet (500 mg total) by mouth daily with dinner    montelukast (Singulair) 10 mg tablet 10 mg     Allergies   Allergen Reactions    Dog Epithelium Allergic Rhinitis, Cough, Dizziness, Headache, Nasal Congestion and Sneezing    Dog Epithelium (Canis Lupus Familiaris) Other (See Comments)     Immunization History   Administered Date(s) Administered    COVID-19 PFIZER VACCINE 0.3 ML IM 04/18/2021, 05/12/2021    Hep B, adult 11/11/2016, 12/12/2016, 10/12/2017    INFLUENZA 11/03/2010, 10/10/2011, 10/11/2012, 10/03/2013, 10/15/2020    Tdap 10/03/2013     Objective     /86 (BP Location: Left arm, Patient Position: Sitting, Cuff Size: Standard)   Pulse (!) 107   Temp 97.9 °F (36.6 °C) (Tympanic)   Ht 5' 3\" (1.6 m)   Wt 92 kg (202 lb 14.4 oz)   SpO2 97%   BMI 35.94 kg/m²     Physical Exam  HENT:      Head: Normocephalic and atraumatic.      Right Ear: External ear normal.      Left Ear: External ear normal.      Mouth/Throat:      Pharynx: Oropharynx is clear.   Eyes:      Conjunctiva/sclera: Conjunctivae normal.   Cardiovascular:      Rate and Rhythm: Normal rate and regular rhythm.      Heart sounds: Normal heart sounds.   Pulmonary:      Effort: Pulmonary effort is normal.      Breath sounds: Normal breath sounds.   Abdominal:      General: There is no distension.      Palpations: Abdomen is soft.      Tenderness: There is no abdominal tenderness.   Musculoskeletal:      Cervical back: Neck supple.      Right lower leg: No edema.      Left lower leg: No edema.   Skin:     General: Skin is warm and dry.   Neurological:      Mental Status: She is alert and oriented to person, place, and " time.       Administrative Statements

## 2024-05-21 ENCOUNTER — TELEPHONE (OUTPATIENT)
Dept: OTHER | Facility: HOSPITAL | Age: 49
End: 2024-05-21

## 2024-05-22 ENCOUNTER — ANESTHESIA (OUTPATIENT)
Dept: GASTROENTEROLOGY | Facility: AMBULARY SURGERY CENTER | Age: 49
End: 2024-05-22

## 2024-05-22 ENCOUNTER — ANESTHESIA EVENT (OUTPATIENT)
Dept: GASTROENTEROLOGY | Facility: AMBULARY SURGERY CENTER | Age: 49
End: 2024-05-22

## 2024-05-22 ENCOUNTER — HOSPITAL ENCOUNTER (OUTPATIENT)
Dept: GASTROENTEROLOGY | Facility: AMBULARY SURGERY CENTER | Age: 49
Setting detail: OUTPATIENT SURGERY
Discharge: HOME | End: 2024-05-22
Attending: INTERNAL MEDICINE | Admitting: INTERNAL MEDICINE
Payer: COMMERCIAL

## 2024-05-22 VITALS
BODY MASS INDEX: 35.94 KG/M2 | HEART RATE: 75 BPM | WEIGHT: 202.82 LBS | SYSTOLIC BLOOD PRESSURE: 142 MMHG | DIASTOLIC BLOOD PRESSURE: 78 MMHG | OXYGEN SATURATION: 100 % | TEMPERATURE: 99.6 F | RESPIRATION RATE: 18 BRPM | HEIGHT: 63 IN

## 2024-05-22 DIAGNOSIS — Z12.11 SCREENING FOR COLON CANCER: ICD-10-CM

## 2024-05-22 LAB
EXT PREGNANCY TEST URINE: NEGATIVE
EXT. CONTROL: NORMAL
GLUCOSE SERPL-MCNC: 100 MG/DL (ref 65–140)

## 2024-05-22 PROCEDURE — 81025 URINE PREGNANCY TEST: CPT | Performed by: ANESTHESIOLOGY

## 2024-05-22 PROCEDURE — 45385 COLONOSCOPY W/LESION REMOVAL: CPT | Performed by: INTERNAL MEDICINE

## 2024-05-22 PROCEDURE — 88305 TISSUE EXAM BY PATHOLOGIST: CPT | Performed by: PATHOLOGY

## 2024-05-22 PROCEDURE — 82948 REAGENT STRIP/BLOOD GLUCOSE: CPT

## 2024-05-22 RX ORDER — SODIUM CHLORIDE, SODIUM LACTATE, POTASSIUM CHLORIDE, CALCIUM CHLORIDE 600; 310; 30; 20 MG/100ML; MG/100ML; MG/100ML; MG/100ML
125 INJECTION, SOLUTION INTRAVENOUS CONTINUOUS
Status: DISCONTINUED | OUTPATIENT
Start: 2024-05-22 | End: 2024-05-26 | Stop reason: HOSPADM

## 2024-05-22 RX ORDER — ONDANSETRON 2 MG/ML
4 INJECTION INTRAMUSCULAR; INTRAVENOUS ONCE AS NEEDED
Status: CANCELLED | OUTPATIENT
Start: 2024-05-22

## 2024-05-22 RX ORDER — PROPOFOL 10 MG/ML
INJECTION, EMULSION INTRAVENOUS AS NEEDED
Status: DISCONTINUED | OUTPATIENT
Start: 2024-05-22 | End: 2024-05-22 | Stop reason: HOSPADM

## 2024-05-22 RX ORDER — LIDOCAINE HYDROCHLORIDE 10 MG/ML
INJECTION, SOLUTION EPIDURAL; INFILTRATION; INTRACAUDAL; PERINEURAL AS NEEDED
Status: DISCONTINUED | OUTPATIENT
Start: 2024-05-22 | End: 2024-05-22 | Stop reason: HOSPADM

## 2024-05-22 RX ORDER — SODIUM CHLORIDE, SODIUM LACTATE, POTASSIUM CHLORIDE, CALCIUM CHLORIDE 600; 310; 30; 20 MG/100ML; MG/100ML; MG/100ML; MG/100ML
INJECTION, SOLUTION INTRAVENOUS CONTINUOUS PRN
Status: DISCONTINUED | OUTPATIENT
Start: 2024-05-22 | End: 2024-05-22 | Stop reason: HOSPADM

## 2024-05-22 RX ADMIN — LIDOCAINE HYDROCHLORIDE 50 MG: 10 INJECTION, SOLUTION EPIDURAL; INFILTRATION; INTRACAUDAL; PERINEURAL at 09:07

## 2024-05-22 RX ADMIN — PROPOFOL 150 MG: 10 INJECTION, EMULSION INTRAVENOUS at 09:07

## 2024-05-22 RX ADMIN — SODIUM CHLORIDE, SODIUM LACTATE, POTASSIUM CHLORIDE, AND CALCIUM CHLORIDE: .6; .31; .03; .02 INJECTION, SOLUTION INTRAVENOUS at 09:02

## 2024-05-22 RX ADMIN — PROPOFOL 100 MG: 10 INJECTION, EMULSION INTRAVENOUS at 09:10

## 2024-05-22 RX ADMIN — SODIUM CHLORIDE, SODIUM LACTATE, POTASSIUM CHLORIDE, AND CALCIUM CHLORIDE 125 ML/HR: .6; .31; .03; .02 INJECTION, SOLUTION INTRAVENOUS at 08:54

## 2024-05-22 NOTE — ANESTHESIA POSTPROCEDURE EVALUATION
Post-Op Assessment Note    CV Status:  Stable  Pain Score: 0    Pain management: adequate       Mental Status:  Alert and awake   Hydration Status:  Euvolemic   PONV Controlled:  Controlled   Airway Patency:  Patent     Post Op Vitals Reviewed: Yes    No anethesia notable event occurred.    Staff: CRNA               BP   133/71   Temp      Pulse  86   Resp   18   SpO2   97

## 2024-05-22 NOTE — ANESTHESIA PREPROCEDURE EVALUATION
Procedure:  COLONOSCOPY    Relevant Problems   CARDIO   (+) Benign essential HTN   (+) Hyperlipidemia      GI/HEPATIC   (+) Hiatal hernia      MUSCULOSKELETAL   (+) Hiatal hernia      PULMONARY (within normal limits)        Physical Exam    Airway    Mallampati score: I  TM Distance: >3 FB  Neck ROM: full     Dental   No notable dental hx     Cardiovascular  Cardiovascular exam normal    Pulmonary  Pulmonary exam normal     Other Findings  post-pubertal.      Anesthesia Plan  ASA Score- 2     Anesthesia Type- IV sedation with anesthesia with ASA Monitors.         Additional Monitors:     Airway Plan:            Plan Factors-Exercise tolerance (METS): >4 METS.    Chart reviewed.   Existing labs reviewed. Patient summary reviewed.    Patient is not a current smoker.              Induction-     Postoperative Plan-         Informed Consent- Anesthetic plan and risks discussed with patient.  I personally reviewed this patient with the CRNA. Discussed and agreed on the Anesthesia Plan with the CRNA..        
N/A

## 2024-05-22 NOTE — H&P
"History and Physical -  [facility]  Olga Short 48 y.o. female MRN: [id number]        HPI: 48-year-old female was referred for screening colonoscopy.  Regular bowel movements.    Historical Information   Past Medical History:   Diagnosis Date    Gestational diabetes      Past Surgical History:   Procedure Laterality Date    APPENDECTOMY      BUNIONECTOMY       Social History   Social History     Substance and Sexual Activity   Alcohol Use Yes    Comment: socially     Social History     Substance and Sexual Activity   Drug Use Never     Social History     Tobacco Use   Smoking Status Never   Smokeless Tobacco Never     Family History   Problem Relation Age of Onset    Diabetes Mother     Hypertension Mother     Diabetes Father     Hypertension Father     Hypertension Sister     Hypertension Brother     Diabetes Paternal Uncle     Diabetes Cousin        Meds/Allergies     Not in a hospital admission.    Allergies   Allergen Reactions    Dog Epithelium Allergic Rhinitis, Cough, Dizziness, Headache, Nasal Congestion and Sneezing    Dog Epithelium (Canis Lupus Familiaris) Other (See Comments)       Objective     Blood pressure 164/92, pulse 90, temperature 99.6 °F (37.6 °C), temperature source Tympanic, resp. rate 18, height 5' 3\" (1.6 m), weight 92 kg (202 lb 13.2 oz), SpO2 100%.    Physical Exam:    Chest- CTA  Heart- RRR  Abdomen- NT/ND  Extremities- No edema    ASSESSMENT:     Screening for colon cancer    PLAN:    Colonoscopy              "

## 2024-05-23 ENCOUNTER — TELEPHONE (OUTPATIENT)
Age: 49
End: 2024-05-23

## 2024-05-23 DIAGNOSIS — R92.30 DENSE BREAST TISSUE ON MAMMOGRAM, UNSPECIFIED TYPE: Primary | ICD-10-CM

## 2024-05-23 NOTE — TELEPHONE ENCOUNTER
Called patient to discuss results     Reporting that she would like US with mammogram 2/2 to dense breasts - order placed     Reviewed colonoscopy

## 2024-05-24 PROCEDURE — 88305 TISSUE EXAM BY PATHOLOGIST: CPT | Performed by: PATHOLOGY

## 2024-06-02 NOTE — ASSESSMENT & PLAN NOTE
Chronic, stable, discussed continuing lisinopril 5 mg. Advised patient to make BP log and bring to follow up appointment.    Patient is a 57-year-old male who comes in with left-sided neck pain. Patient says that he was cracking his neck last night at around midnight when he started having pain in the left side of the muscles of his neck. He denies midline spinal tenderness or bony tenderness. He says the pain is worsened when he tries to bend his neck laterally or with any twisting motion. He denies numbness tingling weakness or difficulty moving the extremities. The pain is nonradiating. He denies chest pain or shortness of breath. The history is provided by the patient. No  was used. Neck Pain   This is a new problem. The current episode started 6 to 12 hours ago. The pain is associated with twisting. There has been no fever. The pain is present in the left side. The quality of the pain is described as aching. The pain does not radiate. The pain is mild. The symptoms are aggravated by bending, twisting and certain positions. Pertinent negatives include no photophobia, no visual change, no chest pain, no syncope, no numbness, no weight loss, no headaches, no bowel incontinence, no bladder incontinence, no leg pain, no paresis, no tingling and no weakness. He has tried nothing for the symptoms.         Past Medical History:   Diagnosis Date    Acute abscess     Acute gastroenteritis     Condyloma acuminatum of penis     Eczema     Folliculitis     H/O vitamin D deficiency     History of ADHD     Lip laceration     Right ankle injury     Testicular asymmetry        Past Surgical History:   Procedure Laterality Date    HX ADENOIDECTOMY      at age 1   [de-identified] KNEE ARTHROSCOPY Left     HX TONSILLECTOMY           Family History:   Problem Relation Age of Onset    Diabetes Father     Glaucoma Mother     No Known Problems Sister     No Known Problems Brother     No Known Problems Brother        Social History     Socioeconomic History    Marital status: SINGLE     Spouse name: Not on file    Number of children: Not on file    Years of education: Not on file    Highest education level: Not on file   Occupational History    Not on file   Social Needs    Financial resource strain: Not on file    Food insecurity     Worry: Not on file     Inability: Not on file    Transportation needs     Medical: Not on file     Non-medical: Not on file   Tobacco Use    Smoking status: Current Some Day Smoker    Smokeless tobacco: Never Used    Tobacco comment: 1 cigar   Substance and Sexual Activity    Alcohol use: Yes     Frequency: Monthly or less     Drinks per session: 1 or 2     Binge frequency: Never    Drug use: Yes     Types: Marijuana    Sexual activity: Not on file   Lifestyle    Physical activity     Days per week: Not on file     Minutes per session: Not on file    Stress: Not on file   Relationships    Social connections     Talks on phone: Not on file     Gets together: Not on file     Attends Ca[employer]ism service: Not on file     Active member of club or organization: Not on file     Attends meetings of clubs or organizations: Not on file     Relationship status: Not on file    Intimate partner violence     Fear of current or ex partner: Not on file     Emotionally abused: Not on file     Physically abused: Not on file     Forced sexual activity: Not on file   Other Topics Concern    Not on file   Social History Narrative    Not on file         ALLERGIES: Shellfish derived    Review of Systems   Constitutional: Negative for weight loss. Eyes: Negative for photophobia. Cardiovascular: Negative for chest pain and syncope. Gastrointestinal: Negative for bowel incontinence. Genitourinary: Negative for bladder incontinence. Musculoskeletal: Positive for neck pain. Skin: Negative for color change. Neurological: Negative for tingling, weakness, numbness and headaches.        Vitals:    03/04/21 0804   BP: 104/74   Pulse: 78   Resp: 16   Temp: 97.9 °F (36.6 °C)   SpO2: 98%   Weight: 72.6 kg (160 lb)   Height: 5' 8\" (1.727 m)            Physical Exam  Vitals signs reviewed. Constitutional:       Appearance: Normal appearance. HENT:      Head: Normocephalic and atraumatic. Eyes:      Conjunctiva/sclera: Conjunctivae normal.   Cardiovascular:      Rate and Rhythm: Normal rate. Pulmonary:      Effort: Pulmonary effort is normal.   Musculoskeletal:      Right shoulder: Normal.      Left shoulder: Normal.      Right elbow: Normal.     Left elbow: Normal.      Cervical back: He exhibits decreased range of motion, tenderness and spasm. He exhibits no bony tenderness, no swelling, no edema, no deformity, no laceration, no pain and normal pulse. Comments: Tenderness along the left sternocleidomastoid. Diminished range of motion with lateral bending to the left and with twisting to the left. Musculoskeletal spasm on the left. No midline spinal tenderness, no crepitus or step-offs. Normal sensory motor and strength of bilateral upper extremities. Skin:     General: Skin is warm and dry. Neurological:      General: No focal deficit present. Mental Status: He is alert. Sensory: No sensory deficit. Motor: No weakness. Coordination: Coordination normal.          MDM  Number of Diagnoses or Management Options  Neck muscle spasm: new and requires workup  Sternocleidomastoid muscle tenderness: new and requires workup  Diagnosis management comments: Patient comes in with neck pain after cracking his neck last night. He has no midline spinal tenderness and no focal neurologic deficit on examination. He is neurovascularly intact. He does have tenderness along the sternocleidomastoid with spasm. He has no chest pain or shortness of breath. Pain is reproducible on examination. Will be treated with a dose of Toradol and given a prescription for a muscle relaxant. Return precautions discussed.        Amount and/or Complexity of Data Reviewed  Tests in the medicine section of CPT®: reviewed and ordered    Risk of Complications, Morbidity, and/or Mortality  Presenting problems: moderate  Diagnostic procedures: low  Management options: low    Patient Progress  Patient progress: stable         Procedures

## 2024-08-07 DIAGNOSIS — I10 BENIGN ESSENTIAL HTN: ICD-10-CM

## 2024-08-07 RX ORDER — LISINOPRIL 5 MG/1
5 TABLET ORAL DAILY
Qty: 90 TABLET | Refills: 0 | Status: SHIPPED | OUTPATIENT
Start: 2024-08-07 | End: 2024-11-05

## 2024-08-09 ENCOUNTER — OFFICE VISIT (OUTPATIENT)
Age: 49
End: 2024-08-09

## 2024-08-09 VITALS
OXYGEN SATURATION: 98 % | HEART RATE: 99 BPM | SYSTOLIC BLOOD PRESSURE: 126 MMHG | WEIGHT: 200 LBS | BODY MASS INDEX: 35.43 KG/M2 | TEMPERATURE: 98.6 F | DIASTOLIC BLOOD PRESSURE: 86 MMHG

## 2024-08-09 DIAGNOSIS — R53.82 CHRONIC FATIGUE: ICD-10-CM

## 2024-08-09 DIAGNOSIS — E78.49 OTHER HYPERLIPIDEMIA: ICD-10-CM

## 2024-08-09 DIAGNOSIS — L73.8 PSEUDOFOLLICULITIS: ICD-10-CM

## 2024-08-09 DIAGNOSIS — R73.03 PREDIABETES: Primary | ICD-10-CM

## 2024-08-09 DIAGNOSIS — I10 BENIGN ESSENTIAL HTN: ICD-10-CM

## 2024-08-09 LAB — SL AMB POCT HEMOGLOBIN AIC: 6 (ref ?–6.5)

## 2024-08-09 PROCEDURE — 99213 OFFICE O/P EST LOW 20 MIN: CPT | Performed by: FAMILY MEDICINE

## 2024-08-09 PROCEDURE — 83036 HEMOGLOBIN GLYCOSYLATED A1C: CPT | Performed by: FAMILY MEDICINE

## 2024-08-09 RX ORDER — METFORMIN HYDROCHLORIDE 500 MG/1
500 TABLET, EXTENDED RELEASE ORAL
Qty: 90 TABLET | Refills: 3 | Status: SHIPPED | OUTPATIENT
Start: 2024-08-09 | End: 2024-11-07

## 2024-08-09 NOTE — ASSESSMENT & PLAN NOTE
Chronic, noting right inguinal lesions x 3 that are open. Pt noting hx of lesions that can be closed and irritated. No drainage or bleeding on examination. Pt reporting she waxes and has these lesions occur    Plan:  -Avoid waxing until complete resolution  -Start bactroban ointment on lesions   -call office if lesions return that are closed or painful

## 2024-08-09 NOTE — ASSESSMENT & PLAN NOTE
Chronic, stable, patient reporting improved diet and states she has been increasing exercise.   Repeat A1c 6    Plan:  -continue metformin 500 mg daily

## 2024-08-09 NOTE — PROGRESS NOTES
"Ambulatory Visit  Name: Olga Short      : 1975      MRN: [id number]  Encounter Provider: Venecia Sosa DO  Encounter Date: 2024   Encounter department: [facility]    Assessment & Plan   1. Prediabetes  Assessment & Plan:  Chronic, stable, patient reporting improved diet and states she has been increasing exercise.   Repeat A1c 6    Plan:  -continue metformin 500 mg daily   Orders:  -     POCT hemoglobin A1c  -     metFORMIN (GLUCOPHAGE-XR) 500 mg 24 hr tablet; Take 1 tablet (500 mg total) by mouth daily with dinner  2. Other hyperlipidemia  Assessment & Plan:  Chronic, stable, continue statin & lifestyle modifications. Ordered repeat lipid panel  Orders:  -     Lipid Panel with Direct LDL reflex; Future  -     Lipid Panel with Direct LDL reflex  3. Benign essential HTN  Assessment & Plan:  Chronic, stable, continue lisinopril 5 mg and recommend regular BP monitoring   4. Chronic fatigue  -     Vitamin D 25 hydroxy; Future  -     Iron Panel (Includes Ferritin, Iron Sat%, Iron, and TIBC); Future  -     Vitamin D 25 hydroxy  5. Pseudofolliculitis  Assessment & Plan:  Chronic, noting right inguinal lesions x 3 that are open. Pt noting hx of lesions that can be closed and irritated. No drainage or bleeding on examination. Pt reporting she waxes and has these lesions occur    Plan:  -Avoid waxing until complete resolution  -Start bactroban ointment on lesions   -call office if lesions return that are closed or painful  Orders:  -     mupirocin (BACTROBAN) 2 % ointment; Apply topically 3 (three) times a day       History of Present Illness     HPI    Reporting she has an ingrown hair at right inguinal region - \"for months\" - stating that they are not bleeding but are painful - not oozing - no white head   Stating she has been waxing   Just noting some chronic fatigue - microcytic anemia - pending vitamin D   Cervical cancer screening is scheduled with obgyn "     Review of Systems   Constitutional:  Negative for activity change, chills and fever.   HENT: Negative.     Respiratory: Negative.     Cardiovascular: Negative.    Gastrointestinal: Negative.    Musculoskeletal: Negative.    Skin:  Positive for rash and wound.   Neurological:  Negative for dizziness, seizures, syncope and light-headedness.       Objective     /86 (BP Location: Right arm, Patient Position: Sitting, Cuff Size: Large)   Pulse 99   Temp 98.6 °F (37 °C) (Tympanic)   Wt 90.7 kg (200 lb)   SpO2 98%   BMI 35.43 kg/m²     Physical Exam  HENT:      Head: Normocephalic and atraumatic.      Right Ear: External ear normal.      Left Ear: External ear normal.      Mouth/Throat:      Pharynx: Oropharynx is clear.   Eyes:      Conjunctiva/sclera: Conjunctivae normal.   Cardiovascular:      Rate and Rhythm: Normal rate and regular rhythm.      Heart sounds: Normal heart sounds.   Pulmonary:      Effort: Pulmonary effort is normal.      Breath sounds: Normal breath sounds.   Abdominal:      General: There is no distension.      Palpations: Abdomen is soft.      Tenderness: There is no abdominal tenderness.   Musculoskeletal:      Cervical back: Neck supple.      Right lower leg: No edema.      Left lower leg: No edema.   Skin:     General: Skin is warm and dry.      Findings: Lesion (right inguinal lesions x 3) present.   Neurological:      Mental Status: She is alert and oriented to person, place, and time.       Administrative Statements

## 2024-08-10 LAB
25(OH)D3+25(OH)D2 SERPL-MCNC: 28.1 NG/ML (ref 30–100)
CHOLEST SERPL-MCNC: 152 MG/DL (ref 100–199)
FERRITIN SERPL-MCNC: 123 NG/ML (ref 15–150)
HDLC SERPL-MCNC: 58 MG/DL
IRON SATN MFR SERPL: 22 % (ref 15–55)
IRON SERPL-MCNC: 65 UG/DL (ref 27–159)
LDLC SERPL CALC-MCNC: 81 MG/DL (ref 0–99)
LDLC/HDLC SERPL: 1.4 RATIO (ref 0–3.2)
SL AMB VLDL CHOLESTEROL CALC: 13 MG/DL (ref 5–40)
TIBC SERPL-MCNC: 302 UG/DL (ref 250–450)
TRIGL SERPL-MCNC: 63 MG/DL (ref 0–149)
UIBC SERPL-MCNC: 237 UG/DL (ref 131–425)

## 2024-08-12 ENCOUNTER — TELEPHONE (OUTPATIENT)
Dept: OTHER | Facility: HOSPITAL | Age: 49
End: 2024-08-12

## 2024-11-01 DIAGNOSIS — R73.03 PREDIABETES: ICD-10-CM

## 2024-11-01 RX ORDER — METFORMIN HYDROCHLORIDE 500 MG/1
500 TABLET, EXTENDED RELEASE ORAL
Qty: 90 TABLET | Refills: 3 | Status: SHIPPED | OUTPATIENT
Start: 2024-11-01 | End: 2025-10-27

## 2024-11-01 NOTE — TELEPHONE ENCOUNTER
Vm on rx ;line:    Hello my name is Olga Bryant. I'm Doctor Elsa is my doctor. She wrote a prescription for me in April this of 24 for met forum and I tried to refill it was Metformin 500 milligrams 24 hour tablet Take one by mouth with dinner with a 90 day supply. The [facility] at [address] [place] stated that the prescriber, which is a doctor, closed that close that medicine on in August. So I'm kind of confused. Am I not taking the medicine no more or I don't need to take the medicine no more? Or is that if that was an error? Can you give me a call [phone]?    You received a voice mail from OLGA BRYANT.

## 2024-11-01 NOTE — TELEPHONE ENCOUNTER
Patient is requesting her Rx to be sent top [facility] in [facility]. Her pharmacy will NOT cover at that pharmacy.

## 2024-11-03 DIAGNOSIS — I10 BENIGN ESSENTIAL HTN: ICD-10-CM

## 2024-11-04 RX ORDER — LISINOPRIL 5 MG/1
5 TABLET ORAL DAILY
Qty: 90 TABLET | Refills: 0 | Status: SHIPPED | OUTPATIENT
Start: 2024-11-04 | End: 2025-02-02

## 2024-11-04 NOTE — TELEPHONE ENCOUNTER
VM left on RX line:    Good morning. My name is Olga Short. I'm calling back. I initially called on Friday to get a medication refill which was the metformin 500 milligrams and 90 day count with three refills, but the doctor called it into the wrong pharmacy. This medication needs to go through [facility] instead of [facility] because I don't have a Co payment to [facility] it make forum go to [facility]. I have a Co payment The address for [facility] is [address]. Again, my name is Olga Short, my date of birth for July 3rd, 1975 and I'm calling about my medication that needs to be refilled because I don't have many more left is Metformin, 500 milligrams, 90 days with three refills. Thank you.    Clinical, can we move the script to [facility] on [address] please?

## 2024-12-12 ENCOUNTER — OFFICE VISIT (OUTPATIENT)
Age: 49
End: 2024-12-12

## 2024-12-12 VITALS
TEMPERATURE: 98 F | SYSTOLIC BLOOD PRESSURE: 123 MMHG | HEART RATE: 80 BPM | HEIGHT: 63 IN | OXYGEN SATURATION: 99 % | BODY MASS INDEX: 34.34 KG/M2 | RESPIRATION RATE: 17 BRPM | DIASTOLIC BLOOD PRESSURE: 83 MMHG | WEIGHT: 193.8 LBS

## 2024-12-12 DIAGNOSIS — Z23 ENCOUNTER FOR IMMUNIZATION: ICD-10-CM

## 2024-12-12 DIAGNOSIS — R73.03 PREDIABETES: ICD-10-CM

## 2024-12-12 DIAGNOSIS — I10 BENIGN ESSENTIAL HTN: Primary | ICD-10-CM

## 2024-12-12 DIAGNOSIS — E78.49 OTHER HYPERLIPIDEMIA: ICD-10-CM

## 2024-12-12 PROCEDURE — 90471 IMMUNIZATION ADMIN: CPT | Performed by: FAMILY MEDICINE

## 2024-12-12 PROCEDURE — 90750 HZV VACC RECOMBINANT IM: CPT | Performed by: FAMILY MEDICINE

## 2024-12-12 PROCEDURE — 99213 OFFICE O/P EST LOW 20 MIN: CPT | Performed by: FAMILY MEDICINE

## 2024-12-12 RX ORDER — LORATADINE 10 MG/1
10 TABLET ORAL DAILY
COMMUNITY

## 2024-12-12 NOTE — PROGRESS NOTES
Name: Olga Short      : 1975      MRN: [id number]  Encounter Provider: Venecia Sosa DO  Encounter Date: 2024   Encounter department: [facility]  :  Assessment & Plan  Benign essential HTN  Chronic, patient noting that she has had some episodes of low blood pressure that were asymptomatic with BP less than 100 systolic.    Discontinue lisinopril at this time  Monitor twice weekly for 3 weeks  If BP elevated, restart lisinopril 5 mg every other day  Encounter for immunization    Orders:    Zoster Vaccine Recombinant IM    Prediabetes  Chronic, patient noting that she has been attempting to improve her diet significantly and has been compliant with prediabetic diet    Discontinue metformin  Other hyperlipidemia  Chronic, stable continue statin          History of Present Illness     HPI    Presents for f/u of chronic conditions:  -noting episode of low BP, taking it once weekly -> asymptomatic < 100/70 x 3   -will D/C lisinopril, goal < 140/90   -will dc metformin and see if we need to go back on if A1c - recheck in 2 months   -will get shingles vaccine   -losing weight consistently with exercise and diet   -completed mammogram, pap & HPV - sees another provider - all normal   - was last period   -having hot flashes, perimenopausal, periods spacing out -> symptoms improving was 1-2 times per week - >consider OCP vs paxil   -taking claritin intermittently, will start daily     Review of Systems   Constitutional:  Negative for fatigue and fever.   HENT:  Negative for congestion, postnasal drip, rhinorrhea, sneezing, sore throat and tinnitus.    Respiratory:  Negative for cough, shortness of breath and wheezing.    Cardiovascular:  Negative for chest pain and palpitations.   Gastrointestinal:  Negative for abdominal pain, constipation, diarrhea, nausea and vomiting.   Musculoskeletal:  Negative for arthralgias, back pain, joint swelling, myalgias and  "neck stiffness.   Skin:  Negative for rash and wound.   Neurological:  Negative for weakness and numbness.       Objective   /83 (BP Location: Right arm, Patient Position: Sitting, Cuff Size: Standard)   Pulse 80   Temp 98 °F (36.7 °C) (Tympanic)   Resp 17   Ht 5' 3\" (1.6 m)   Wt 87.9 kg (193 lb 12.8 oz)   SpO2 99%   BMI 34.33 kg/m²      Physical Exam  Vitals and nursing note reviewed.   Constitutional:       General: She is not in acute distress.     Appearance: She is well-developed.   HENT:      Head: Normocephalic and atraumatic.   Eyes:      Conjunctiva/sclera: Conjunctivae normal.   Cardiovascular:      Rate and Rhythm: Normal rate and regular rhythm.      Heart sounds: No murmur heard.  Pulmonary:      Effort: Pulmonary effort is normal. No respiratory distress.      Breath sounds: Normal breath sounds.   Abdominal:      Palpations: Abdomen is soft.      Tenderness: There is no abdominal tenderness.   Musculoskeletal:         General: No swelling.      Cervical back: Neck supple.   Skin:     General: Skin is warm and dry.      Capillary Refill: Capillary refill takes less than 2 seconds.   Neurological:      Mental Status: She is alert.   Psychiatric:         Mood and Affect: Mood normal.         "

## 2024-12-12 NOTE — ASSESSMENT & PLAN NOTE
Chronic, patient noting that she has had some episodes of low blood pressure that were asymptomatic with BP less than 100 systolic.    Discontinue lisinopril at this time  Monitor twice weekly for 3 weeks  If BP elevated, restart lisinopril 5 mg every other day

## 2024-12-12 NOTE — ASSESSMENT & PLAN NOTE
Chronic, patient noting that she has been attempting to improve her diet significantly and has been compliant with prediabetic diet    Discontinue metformin

## 2024-12-16 NOTE — ED ADULT NURSE NOTE - CAS EDN DISCHARGE INTERVENTIONS
Removal of Nexplanon Device    Date/Time: 12/16/2024 9:00 AM    Performed by: Grant See NP  Authorized by: Grant See NP    Consent given by:  Patient  Procedure risks and benefits discussed: yes    Patient questions answered: yes    Patient agrees, verbalizes understanding, and wants to proceed: yes    Educational handouts given: no    Instructions and paperwork completed: yes    Implant grasped by: hemostat  Removal due to infection and inflammatory reaction: no    Removal due to mechanical complications: no    Removed with no complications: yes     Pt tolerated well; not interested in another method at this time; Safe sex practices discussed.  Removal due to expiration: yes    Arm: right arm  Palpation confirms location: yes  Small stab incision was made in arm: yes  Upon removal device was intact: yes  Site was close with steri-strips and pressure bandage applied: yes  Pre-procedure timeout performed: yes  Prepped with:  povidone-iodine 7.5% surgical scrub  Local anesthetic:  Lidocaine with epinephrine   The site was cleaned  and prepped in a sterile fashion: yes  Specimen sent to pathology: Yes  
IV discontinued, cath removed intact

## 2025-01-03 DIAGNOSIS — T78.40XD ALLERGY, SUBSEQUENT ENCOUNTER: Primary | ICD-10-CM

## 2025-01-03 NOTE — TELEPHONE ENCOUNTER
on refill line:     Hello, good morning, my name is Olga Short. My birthday is July 3rd, 1975 and I'm calling to get a refill on the medication Montelukast, commonly known as Singulair, a 90 day supply with three refills in the pharmacy to call it into [facility] and at 2:[address] [facility]. and the telephone number is [phone]. Again, the medication is Montelukast 10 milligram tablet, commonly known as Singulair, a 90 day supply with three refills. I'm running out. I have about a little over a week left of the pills. So again, my name is Olga Short. My birthday is July 3rd, 1975. Thank you.

## 2025-01-05 RX ORDER — MONTELUKAST SODIUM 10 MG/1
10 TABLET ORAL
Qty: 30 TABLET | Refills: 1 | Status: SHIPPED | OUTPATIENT
Start: 2025-01-05

## 2025-01-06 DIAGNOSIS — Z00.6 ENCOUNTER FOR EXAMINATION FOR NORMAL COMPARISON OR CONTROL IN CLINICAL RESEARCH PROGRAM: ICD-10-CM

## 2025-01-06 NOTE — TELEPHONE ENCOUNTER
Vm on rx line:    Hello, my name is Olga Bryant. I'm calling again. I had called to get a new prescription for the Montelukast pill that I have 10 milligrams. I asked that it be called in for 90 day supply, 3 refills The pharmacy did not get that. They got it for a 30 day supply with one refill and so I don't have to pay for it through my insurance. It has to be written with a 90 day supply with three refills. Again, my name is Olga Bryant. My birthday is July 3rd 1975 and my telephone number is [phone] and he called my medication in at [facility] at 2:[address] [facility]. again. Can you call them again and change the prescription to a 90 day supply with three refills? Thank you.  You received a voice mail from OLGA BRYANT.    Dr. Sosa can you please review?

## 2025-01-10 ENCOUNTER — APPOINTMENT (OUTPATIENT)
Dept: LAB | Facility: CLINIC | Age: 50
End: 2025-01-10

## 2025-01-10 DIAGNOSIS — Z00.6 ENCOUNTER FOR EXAMINATION FOR NORMAL COMPARISON OR CONTROL IN CLINICAL RESEARCH PROGRAM: ICD-10-CM

## 2025-01-10 PROCEDURE — 36415 COLL VENOUS BLD VENIPUNCTURE: CPT

## 2025-01-10 NOTE — TELEPHONE ENCOUNTER
Patient is calling regarding her Refill Request as she is requesting this be resent with 90 day supply and 3 refills. Patient states Rx must written this way for insurance to cover. Patient is down to her last 2 doses.    Please see previous message left by patient.

## 2025-01-20 LAB
APOB+LDLR+PCSK9 GENE MUT ANL BLD/T: NOT DETECTED
BRCA1+BRCA2 DEL+DUP + FULL MUT ANL BLD/T: NOT DETECTED
MLH1+MSH2+MSH6+PMS2 GN DEL+DUP+FUL M: NOT DETECTED

## 2025-02-06 DIAGNOSIS — T78.40XD ALLERGY, SUBSEQUENT ENCOUNTER: ICD-10-CM

## 2025-02-06 RX ORDER — MONTELUKAST SODIUM 10 MG/1
10 TABLET ORAL
Qty: 90 TABLET | Refills: 1 | Status: SHIPPED | OUTPATIENT
Start: 2025-02-06

## 2025-03-15 DIAGNOSIS — E78.49 OTHER HYPERLIPIDEMIA: ICD-10-CM

## 2025-03-17 RX ORDER — ATORVASTATIN CALCIUM 10 MG/1
10 TABLET, FILM COATED ORAL DAILY
Qty: 90 TABLET | Refills: 3 | Status: SHIPPED | OUTPATIENT
Start: 2025-03-17

## 2025-04-18 ENCOUNTER — OFFICE VISIT (OUTPATIENT)
Age: 50
End: 2025-04-18

## 2025-04-18 VITALS
OXYGEN SATURATION: 99 % | RESPIRATION RATE: 16 BRPM | TEMPERATURE: 98.7 F | SYSTOLIC BLOOD PRESSURE: 129 MMHG | HEART RATE: 81 BPM | HEIGHT: 63 IN | DIASTOLIC BLOOD PRESSURE: 89 MMHG | WEIGHT: 194 LBS | BODY MASS INDEX: 34.38 KG/M2

## 2025-04-18 DIAGNOSIS — Z23 ENCOUNTER FOR IMMUNIZATION: ICD-10-CM

## 2025-04-18 DIAGNOSIS — R73.03 PREDIABETES: ICD-10-CM

## 2025-04-18 DIAGNOSIS — Z00.00 ANNUAL PHYSICAL EXAM: Primary | ICD-10-CM

## 2025-04-18 DIAGNOSIS — R23.2 HOT FLASHES: ICD-10-CM

## 2025-04-18 DIAGNOSIS — E55.9 VITAMIN D INSUFFICIENCY: ICD-10-CM

## 2025-04-18 DIAGNOSIS — E78.49 OTHER HYPERLIPIDEMIA: ICD-10-CM

## 2025-04-18 DIAGNOSIS — M21.619 BUNION: ICD-10-CM

## 2025-04-18 LAB
SL AMB POCT HEMOGLOBIN AIC: 5.9 (ref ?–6.5)
TSH SERPL DL<=0.005 MIU/L-ACNC: 1.34 UIU/ML (ref 0.45–4.5)

## 2025-04-18 PROCEDURE — 90750 HZV VACC RECOMBINANT IM: CPT | Performed by: FAMILY MEDICINE

## 2025-04-18 PROCEDURE — 90471 IMMUNIZATION ADMIN: CPT | Performed by: FAMILY MEDICINE

## 2025-04-18 PROCEDURE — 99396 PREV VISIT EST AGE 40-64: CPT | Performed by: FAMILY MEDICINE

## 2025-04-18 PROCEDURE — 83036 HEMOGLOBIN GLYCOSYLATED A1C: CPT | Performed by: FAMILY MEDICINE

## 2025-04-18 PROCEDURE — 90715 TDAP VACCINE 7 YRS/> IM: CPT | Performed by: FAMILY MEDICINE

## 2025-04-18 PROCEDURE — 90472 IMMUNIZATION ADMIN EACH ADD: CPT | Performed by: FAMILY MEDICINE

## 2025-04-18 NOTE — ASSESSMENT & PLAN NOTE
Chronic - continue lipitor 10 mg QD  Pending repeat lipid panel   Orders:  •  Lipid Panel with Direct LDL reflex; Future

## 2025-04-18 NOTE — ASSESSMENT & PLAN NOTE
A1c 5.9   Continue diet and exercise control   Orders:  •  POCT hemoglobin A1c  •  Comprehensive metabolic panel; Future

## 2025-04-19 LAB
25(OH)D3+25(OH)D2 SERPL-MCNC: 31.5 NG/ML (ref 30–100)
ALBUMIN SERPL-MCNC: 4.4 G/DL (ref 3.8–4.9)
ALP SERPL-CCNC: 70 IU/L (ref 44–121)
ALT SERPL-CCNC: 12 IU/L (ref 0–32)
AST SERPL-CCNC: 21 IU/L (ref 0–40)
BILIRUB SERPL-MCNC: 0.5 MG/DL (ref 0–1.2)
BUN SERPL-MCNC: 7 MG/DL (ref 6–24)
BUN/CREAT SERPL: 9 (ref 9–23)
CALCIUM SERPL-MCNC: 9.5 MG/DL (ref 8.7–10.2)
CHLORIDE SERPL-SCNC: 103 MMOL/L (ref 96–106)
CHOLEST SERPL-MCNC: 177 MG/DL (ref 100–199)
CO2 SERPL-SCNC: 20 MMOL/L (ref 20–29)
CREAT SERPL-MCNC: 0.78 MG/DL (ref 0.57–1)
EGFR: 92 ML/MIN/1.73
GLOBULIN SER-MCNC: 3.2 G/DL (ref 1.5–4.5)
GLUCOSE SERPL-MCNC: 89 MG/DL (ref 70–99)
HDLC SERPL-MCNC: 70 MG/DL
LDLC SERPL CALC-MCNC: 94 MG/DL (ref 0–99)
LDLC/HDLC SERPL: 1.3 RATIO (ref 0–3.2)
POTASSIUM SERPL-SCNC: 4.5 MMOL/L (ref 3.5–5.2)
PROT SERPL-MCNC: 7.6 G/DL (ref 6–8.5)
SL AMB VLDL CHOLESTEROL CALC: 13 MG/DL (ref 5–40)
SODIUM SERPL-SCNC: 141 MMOL/L (ref 134–144)
TRIGL SERPL-MCNC: 67 MG/DL (ref 0–149)

## 2025-04-20 ENCOUNTER — RESULTS FOLLOW-UP (OUTPATIENT)
Dept: OTHER | Facility: HOSPITAL | Age: 50
End: 2025-04-20

## 2025-04-23 ENCOUNTER — TELEPHONE (OUTPATIENT)
Age: 50
End: 2025-04-23

## 2025-04-23 DIAGNOSIS — B37.9 YEAST INFECTION: Primary | ICD-10-CM

## 2025-04-23 RX ORDER — FLUCONAZOLE 150 MG/1
150 TABLET ORAL ONCE
Qty: 1 TABLET | Refills: 0 | Status: SHIPPED | OUTPATIENT
Start: 2025-04-23 | End: 2025-04-23

## 2025-04-23 NOTE — TELEPHONE ENCOUNTER
PT called in and request a RX be sent to [facility]  in [facility] for diflucan for yeast infection she has. If you have any questions she can be reached at [phone]. Thank You

## 2025-05-02 ENCOUNTER — TELEPHONE (OUTPATIENT)
Age: 50
End: 2025-05-02

## 2025-05-02 DIAGNOSIS — S39.012A BACK STRAIN, INITIAL ENCOUNTER: Primary | ICD-10-CM

## 2025-05-02 NOTE — LETTER
May 4, 2025     Patient: Olga Short  YOB: 1975  Date of Visit: 5/2/2025      To Whom it May Concern:    Olga Short is under my professional care - in the setting of her recent back strain, she should be given the following accommodations:  Work from home for the week of 5/12 - 5/16  If this cannot be accomplished, she should be given excused time off for her treatments    If you have any questions or concerns, please don't hesitate to call.         Sincerely,        Venecia Sosa, DO        CC: No Recipients

## 2025-05-02 NOTE — TELEPHONE ENCOUNTER
Patient is requesting a call back to discuss back pain as she recently threw her back out causing pain. Patient is requesting a work note. Patient may be reached at the number listed below;    [phone]

## 2025-05-04 NOTE — TELEPHONE ENCOUNTER
Noting feels like her back is off with stress of back  Needs doctor note for back saying she can work from home   Noting happens when she is stressed   Declines NSAIDs or muscle relaxers  Noting she is using patches and heat   Amenable to PT    Patient/Family

## 2025-05-23 ENCOUNTER — OFFICE VISIT (OUTPATIENT)
Age: 50
End: 2025-05-23

## 2025-05-23 VITALS
OXYGEN SATURATION: 96 % | RESPIRATION RATE: 17 BRPM | DIASTOLIC BLOOD PRESSURE: 87 MMHG | BODY MASS INDEX: 34.73 KG/M2 | HEART RATE: 81 BPM | HEIGHT: 63 IN | WEIGHT: 196 LBS | SYSTOLIC BLOOD PRESSURE: 144 MMHG

## 2025-05-23 DIAGNOSIS — B37.31 VAGINAL CANDIDA: Primary | ICD-10-CM

## 2025-05-23 RX ORDER — FLUCONAZOLE 150 MG/1
150 TABLET ORAL DAILY
Qty: 2 TABLET | Refills: 0 | Status: SHIPPED | OUTPATIENT
Start: 2025-05-23 | End: 2025-05-25

## 2025-05-23 NOTE — PROGRESS NOTES
Name: Olga Short      : 1975      MRN: [id number]  Encounter Provider: Yovanny Hendrix DO  Encounter Date: 2025   Encounter department: [facility]  :  Assessment & Plan  Vaginal candida  Was treated with 1-dose Diflucan 150 mg 25 and felt symptoms resolved at that time. Began to notice white, chunky discharge return again and it has been a few years since she last had yeast infections like this. It feels dry and itchy on the outside 2/2 menopause and denies odor, bleeding, rash. Has had BV in the past and does not feel it is like this episode. She is currently sexually active with the same partner for 3 years, does not use barrier protection.     Discussed treatment regimen: Diflucan 150 mg one dose then wait 72 hours for 2nd dose. 1 week course of Monistat 100 mg vaginal suppositories.  Discussed hygiene, boric acid vaginal suppositories, and return precautions.  Has OBGYN appointment 9/3/25.         History of Present Illness {?Quick Links Encounters * My Last Note * Last Note in Specialty * Snapshot * Since Last Visit * History :53396}  Patient is a 48 yo F with pmhx of prediabetes, HLD, HTN, hiatal hernia present for yeast infection follow up. Discussed trialing longer course regimen with symptomatic care, follow up instructions, and return precautions.      Review of Systems   Constitutional:  Negative for chills, fatigue and fever.   HENT:  Negative for hearing loss and sore throat.    Eyes:  Negative for visual disturbance.   Respiratory:  Negative for cough and shortness of breath.    Cardiovascular:  Negative for chest pain and leg swelling.   Gastrointestinal:  Negative for abdominal pain, constipation, diarrhea, nausea and vomiting.   Genitourinary:  Positive for vaginal discharge (white, chunky) and vaginal pain (some discomfort on vular region outside vaginal canal). Negative for difficulty urinating, dysuria, flank pain, hematuria, pelvic pain and  "vaginal bleeding.   Musculoskeletal:  Negative for arthralgias and back pain.   Skin:  Negative for rash and wound.   Neurological:  Negative for dizziness, weakness, light-headedness and headaches.   Hematological:  Does not bruise/bleed easily.       Objective {?Quick Links Trend Vitals * Enter New Vitals * Results Review * Timeline (Adult) * Labs * Imaging * Cardiology * Procedures * Lung Cancer Screening * Surgical eConsent :93955}  /87 (BP Location: Left arm, Patient Position: Sitting)   Pulse 81   Resp 17   Ht 5' 3\" (1.6 m)   Wt 88.9 kg (196 lb)   LMP 03/30/2025 (Exact Date)   SpO2 96%   BMI 34.72 kg/m²      Physical Exam  Vitals reviewed. Exam conducted with a chaperone present.   Constitutional:       General: She is not in acute distress.     Appearance: Normal appearance. She is well-developed.   HENT:      Head: Normocephalic and atraumatic.      Right Ear: External ear normal.      Left Ear: External ear normal.      Nose: Nose normal.      Mouth/Throat:      Mouth: Mucous membranes are moist.      Pharynx: Oropharynx is clear.     Eyes:      General:         Right eye: No discharge.         Left eye: No discharge.      Extraocular Movements: Extraocular movements intact.       Cardiovascular:      Rate and Rhythm: Normal rate and regular rhythm.      Pulses: Normal pulses.      Heart sounds: Normal heart sounds.   Pulmonary:      Effort: Pulmonary effort is normal.      Breath sounds: Normal breath sounds.   Abdominal:      General: Abdomen is flat. There is no distension.      Palpations: Abdomen is soft.      Tenderness: There is no abdominal tenderness. There is no right CVA tenderness, left CVA tenderness or guarding.   Genitourinary:     General: Normal vulva.      Labia:         Right: No rash, tenderness, lesion or injury.         Left: No rash, tenderness, lesion or injury.         Comments: Small amount of white, chunky discharge present. No notable odor, rash, bleeding or lesions " noted.    Musculoskeletal:         General: No tenderness. Normal range of motion.      Cervical back: Normal range of motion.      Right lower leg: No edema.      Left lower leg: No edema.     Skin:     General: Skin is warm and dry.      Capillary Refill: Capillary refill takes less than 2 seconds.     Neurological:      General: No focal deficit present.      Mental Status: She is alert and oriented to person, place, and time.     Psychiatric:         Mood and Affect: Mood normal.         Behavior: Behavior normal.

## 2025-06-06 ENCOUNTER — OFFICE VISIT (OUTPATIENT)
Age: 50
End: 2025-06-06
Payer: COMMERCIAL

## 2025-06-06 VITALS — BODY MASS INDEX: 34.73 KG/M2 | RESPIRATION RATE: 17 BRPM | WEIGHT: 196 LBS | HEIGHT: 63 IN

## 2025-06-06 DIAGNOSIS — M21.42 ACQUIRED FLAT FOOT, LEFT: ICD-10-CM

## 2025-06-06 DIAGNOSIS — M21.962 ACQUIRED DEFORMITY OF LEFT FOOT: ICD-10-CM

## 2025-06-06 DIAGNOSIS — M21.41 ACQUIRED FLAT FOOT, RIGHT: ICD-10-CM

## 2025-06-06 DIAGNOSIS — M21.619 BUNION: ICD-10-CM

## 2025-06-06 DIAGNOSIS — M20.12 HALLUX VALGUS OF LEFT FOOT: Primary | ICD-10-CM

## 2025-06-06 PROCEDURE — 99203 OFFICE O/P NEW LOW 30 MIN: CPT | Performed by: PODIATRIST

## 2025-06-06 NOTE — PROGRESS NOTES
Assessment/Plan.   moderate track bound hallux valgus deformity left foot with secondarily inflamed bunion.  Acquired deformity of foot.    Plan.  Chart reviewed.  PCP notes reviewed.  Patient evaluated.  Patient advised on bunions.  At this time we will order x-ray to evaluate severity and extent of condition.  Patient advised on possible treatment options of arthrocentesis or possible surgical intervention.  Return parent.         Diagnoses and all orders for this visit:    Hallux valgus of left foot    Bunion    Acquired deformity of left foot    Acquired flat foot, right    Acquired flat foot, left    Other orders  -     Ambulatory Referral to Podiatry          Subjective:   Patient presents for evaluation of her left foot.  She believes she may have a bunion.  It hurts intermittently.  She has history of right foot bunion requiring surgery.    Allergies   Allergen Reactions    Dog Epithelium Allergic Rhinitis, Cough, Dizziness, Headache, Nasal Congestion and Sneezing    Dog Epithelium (Canis Lupus Familiaris) Other (See Comments)       Current Medications[1]    Problem List[2]       Patient ID: Olga Short is a 49 y.o. female.    HPI    The following portions of the patient's history were reviewed and updated as appropriate:     family history includes Alcohol abuse in her father; Diabetes in her cousin, father, mother, and paternal uncle; Hypertension in her brother, father, mother, and sister.      reports that she has never smoked. She has never used smokeless tobacco. She reports current alcohol use. She reports that she does not use drugs.    Vitals:    06/06/25 1301   Resp: 17       Review of Systems      Objective:  Patient's shoes and socks removed.   Foot Exam    General  General Appearance: appears stated age and healthy   Orientation: alert and oriented to person, place, and time   Affect: appropriate       Right Foot/Ankle     Inspection and Palpation  Arch: pes planus  Skin Exam: skin intact;      Neurovascular  Dorsalis pedis: 3+  Posterior tibial: 3+      Left Foot/Ankle      Inspection and Palpation  Tenderness: great toe metatarsophalangeal joint   Swelling: great toe metatarsophalangeal joint   Arch: pes planus  Hallux valgus: yes  Skin Exam: skin intact;       Physical Exam  Vitals and nursing note reviewed.   Constitutional:       Appearance: Normal appearance.     Cardiovascular:      Rate and Rhythm: Normal rate and regular rhythm.      Pulses:           Dorsalis pedis pulses are 3+ on the right side.        Posterior tibial pulses are 3+ on the right side.     Musculoskeletal:      Left foot: Bunion present.   Feet:      Comments: Patient is pronated in stance and gait.  She has splay of the left forefoot.  Track bound hallux valgus deformity noted.  Bunion noted.  Negative crepitus with range of motion    Skin:     Capillary Refill: Capillary refill takes less than 2 seconds.     Neurological:      Mental Status: She is alert.     Psychiatric:         Mood and Affect: Mood normal.         Behavior: Behavior normal.         Thought Content: Thought content normal.         Judgment: Judgment normal.                          [1]   Current Outpatient Medications:     atorvastatin (LIPITOR) 10 mg tablet, TAKE 1 TABLET(10 MG) BY MOUTH DAILY, Disp: 90 tablet, Rfl: 3    intrauterine copper (PARAGARD) IUD, by Intrauterine route, Disp: , Rfl:     loratadine (CLARITIN) 10 mg tablet, Take 10 mg by mouth in the morning., Disp: , Rfl:     miconazole (MONISTAT 7) 100 mg vaginal suppository, Insert 1 suppository (100 mg total) into the vagina daily at bedtime, Disp: 7 suppository, Rfl: 0    montelukast (SINGULAIR) 10 mg tablet, TAKE 1 TABLET BY MOUTH DAILY AT BEDTIME, Disp: 90 tablet, Rfl: 1    mupirocin (BACTROBAN) 2 % ointment, Apply topically 3 (three) times a day (Patient not taking: Reported on 4/18/2025), Disp: 60 g, Rfl: 0  [2]   Patient Active Problem List  Diagnosis    Abnormal colonoscopy    Hiatal  hernia    Allergies    Vertigo    Hyperlipidemia    Benign essential HTN    Prediabetes    Left knee pain    Pseudofolliculitis

## 2025-07-21 ENCOUNTER — TELEPHONE (OUTPATIENT)
Age: 50
End: 2025-07-21

## 2025-07-21 NOTE — TELEPHONE ENCOUNTER
Yobani Sosa,    Patient is requesting a call back, she has a quick question for you. She has been working out a lot and states that she has been getting reoccurring yeast infections and a rash in the crease of her thighs. She is wondering if this is normal?    She is requesting a call back, but I also put her in for an appointment with you for tomorrow.    Patient can be contacted back at:  [phone]    Please advise.    Thank you

## 2025-07-22 ENCOUNTER — OFFICE VISIT (OUTPATIENT)
Age: 50
End: 2025-07-22

## 2025-07-22 VITALS
HEART RATE: 86 BPM | BODY MASS INDEX: 35.48 KG/M2 | DIASTOLIC BLOOD PRESSURE: 87 MMHG | SYSTOLIC BLOOD PRESSURE: 133 MMHG | TEMPERATURE: 98.6 F | WEIGHT: 200.3 LBS

## 2025-07-22 DIAGNOSIS — B37.31 VULVAR CANDIDIASIS: Primary | ICD-10-CM

## 2025-07-22 PROCEDURE — 99213 OFFICE O/P EST LOW 20 MIN: CPT | Performed by: OBSTETRICS & GYNECOLOGY

## 2025-07-22 RX ORDER — NYSTATIN 100000 U/G
CREAM TOPICAL 2 TIMES DAILY
Qty: 30 G | Refills: 0 | Status: SHIPPED | OUTPATIENT
Start: 2025-07-22